# Patient Record
Sex: MALE | Race: WHITE | NOT HISPANIC OR LATINO | Employment: OTHER | URBAN - NONMETROPOLITAN AREA
[De-identification: names, ages, dates, MRNs, and addresses within clinical notes are randomized per-mention and may not be internally consistent; named-entity substitution may affect disease eponyms.]

---

## 2018-12-12 ENCOUNTER — OFFICE VISIT (OUTPATIENT)
Dept: RETAIL CLINIC | Facility: CLINIC | Age: 58
End: 2018-12-12

## 2018-12-12 VITALS
SYSTOLIC BLOOD PRESSURE: 120 MMHG | RESPIRATION RATE: 16 BRPM | WEIGHT: 166 LBS | DIASTOLIC BLOOD PRESSURE: 80 MMHG | TEMPERATURE: 98 F | OXYGEN SATURATION: 98 % | HEART RATE: 74 BPM

## 2018-12-12 DIAGNOSIS — R05.9 COUGH: ICD-10-CM

## 2018-12-12 DIAGNOSIS — R50.9 FEVER, UNSPECIFIED FEVER CAUSE: ICD-10-CM

## 2018-12-12 DIAGNOSIS — H66.90 EAR INFECTION: Primary | ICD-10-CM

## 2018-12-12 LAB
EXPIRATION DATE: NORMAL
FLUAV AG NPH QL: NEGATIVE
FLUBV AG NPH QL: NEGATIVE
INTERNAL CONTROL: NORMAL
Lab: NORMAL

## 2018-12-12 PROCEDURE — 87804 INFLUENZA ASSAY W/OPTIC: CPT | Performed by: NURSE PRACTITIONER

## 2018-12-12 PROCEDURE — 99203 OFFICE O/P NEW LOW 30 MIN: CPT | Performed by: NURSE PRACTITIONER

## 2018-12-12 RX ORDER — DEXTROMETHORPHAN HYDROBROMIDE AND PROMETHAZINE HYDROCHLORIDE 15; 6.25 MG/5ML; MG/5ML
5 SYRUP ORAL NIGHTLY PRN
Qty: 120 ML | Refills: 0 | Status: SHIPPED | OUTPATIENT
Start: 2018-12-12 | End: 2021-01-29

## 2018-12-12 RX ORDER — GABAPENTIN 800 MG/1
800 TABLET ORAL 3 TIMES DAILY
COMMUNITY
End: 2021-01-29

## 2018-12-12 RX ORDER — BUDESONIDE AND FORMOTEROL FUMARATE DIHYDRATE 160; 4.5 UG/1; UG/1
2 AEROSOL RESPIRATORY (INHALATION)
COMMUNITY
End: 2021-01-29 | Stop reason: SDUPTHER

## 2018-12-12 RX ORDER — ALBUTEROL SULFATE 90 UG/1
2 AEROSOL, METERED RESPIRATORY (INHALATION) EVERY 4 HOURS PRN
COMMUNITY
End: 2021-01-29

## 2018-12-12 RX ORDER — AMOXICILLIN AND CLAVULANATE POTASSIUM 875; 125 MG/1; MG/1
1 TABLET, FILM COATED ORAL 2 TIMES DAILY
Qty: 20 TABLET | Refills: 0 | Status: SHIPPED | OUTPATIENT
Start: 2018-12-12 | End: 2018-12-22

## 2018-12-12 RX ORDER — LEVOTHYROXINE SODIUM 0.1 MG/1
100 TABLET ORAL DAILY
COMMUNITY
End: 2021-01-29

## 2018-12-12 NOTE — PATIENT INSTRUCTIONS
Cough, Adult  A cough helps to clear your throat and lungs. A cough may last only 2-3 weeks (acute), or it may last longer than 8 weeks (chronic). Many different things can cause a cough. A cough may be a sign of an illness or another medical condition.  Follow these instructions at home:  · Pay attention to any changes in your cough.  · Take medicines only as told by your doctor.  ? If you were prescribed an antibiotic medicine, take it as told by your doctor. Do not stop taking it even if you start to feel better.  ? Talk with your doctor before you try using a cough medicine.  · Drink enough fluid to keep your pee (urine) clear or pale yellow.  · If the air is dry, use a cold steam vaporizer or humidifier in your home.  · Stay away from things that make you cough at work or at home.  · If your cough is worse at night, try using extra pillows to raise your head up higher while you sleep.  · Do not smoke, and try not to be around smoke. If you need help quitting, ask your doctor.  · Do not have caffeine.  · Do not drink alcohol.  · Rest as needed.  Contact a doctor if:  · You have new problems (symptoms).  · You cough up yellow fluid (pus).  · Your cough does not get better after 2-3 weeks, or your cough gets worse.  · Medicine does not help your cough and you are not sleeping well.  · You have pain that gets worse or pain that is not helped with medicine.  · You have a fever.  · You are losing weight and you do not know why.  · You have night sweats.  Get help right away if:  · You cough up blood.  · You have trouble breathing.  · Your heartbeat is very fast.  This information is not intended to replace advice given to you by your health care provider. Make sure you discuss any questions you have with your health care provider.  Document Released: 08/30/2012 Document Revised: 05/25/2017 Document Reviewed: 02/24/2016  [employer] Interactive Patient Education © 2018 [employer]

## 2018-12-12 NOTE — PROGRESS NOTES
Hussein presents to the clinic today c/o upper respiratory symptoms which started over a week ago and worsening.  Associated symptoms include congestion, cough, ear pain/pressure, fever and chills. He has tried Tylenol  without adequate relief. He does report he has had his Seasonal Influenza vaccination which was given appx four weeks ago.   URI    This is a new problem. The current episode started in the past 7 days. The problem has been gradually worsening. Maximum temperature: Tactile. The fever has been present for 1 to 2 days. Associated symptoms include congestion, coughing, ear pain, headaches and rhinorrhea. Pertinent negatives include no chest pain, nausea, rash, sinus pain, sore throat, swollen glands, vomiting or wheezing. He has tried acetaminophen for the symptoms.   Refer to ROS for additional information.  Vitals:    12/12/18 1128   BP: 120/80   Pulse: 74   Resp: 16   Temp: 98 °F (36.7 °C)   SpO2: 98%     The following portions of the patient's history were reviewed and updated as appropriate: allergies, current medications, past family history, past medical history, past social history, past surgical history and problem list.    Review of Systems   Constitutional: Positive for activity change, chills, fatigue and fever. Negative for appetite change.   HENT: Positive for congestion, ear pain, postnasal drip and rhinorrhea. Negative for sinus pressure, sinus pain and sore throat.    Eyes: Negative for discharge and redness.   Respiratory: Positive for cough. Negative for shortness of breath and wheezing.    Cardiovascular: Negative for chest pain.   Gastrointestinal: Negative for nausea and vomiting.   Musculoskeletal: Positive for myalgias.   Skin: Negative for color change and rash.   Allergic/Immunologic: Positive for immunocompromised state.   Neurological: Positive for headaches.   Hematological: Negative for adenopathy.   Psychiatric/Behavioral: Positive for sleep disturbance (Due to cough).    All other systems reviewed and are negative.    Physical Exam   Constitutional: He is oriented to person, place, and time. He appears well-developed and well-nourished. No distress.   HENT:   Head: Normocephalic.   Right Ear: Ear canal normal. No mastoid tenderness. Tympanic membrane is erythematous and bulging.   Left Ear: Ear canal normal. No mastoid tenderness. Tympanic membrane is erythematous and bulging.   Nose: Mucosal edema and rhinorrhea present. Right sinus exhibits frontal sinus tenderness. Right sinus exhibits no maxillary sinus tenderness. Left sinus exhibits frontal sinus tenderness. Left sinus exhibits no maxillary sinus tenderness.   Mouth/Throat: Oropharynx is clear and moist. No oropharyngeal exudate.   Eyes: Conjunctivae are normal. Pupils are equal, round, and reactive to light. Right eye exhibits no discharge. Left eye exhibits no discharge. No scleral icterus.   Neck: Neck supple. No tracheal tenderness present.   Cardiovascular: Normal rate, regular rhythm and normal heart sounds. Exam reveals no friction rub.   No murmur heard.  Pulmonary/Chest: Effort normal. No respiratory distress. He has decreased breath sounds. He has no wheezes. He has no rhonchi. He has no rales.   Lymphadenopathy:     He has no cervical adenopathy.   Neurological: He is alert and oriented to person, place, and time.   Skin: Skin is warm and dry. Capillary refill takes less than 2 seconds. No rash noted. No erythema.   Psychiatric: He has a normal mood and affect. His speech is normal and behavior is normal. Judgment and thought content normal.   Vitals reviewed.    Assessment/Plan   Problems Addressed this Visit     None      Visit Diagnoses     Ear infection    -  Primary    Relevant Medications    amoxicillin-clavulanate (AUGMENTIN) 875-125 MG per tablet    Cough        Relevant Medications    promethazine-dextromethorphan (PROMETHAZINE-DM) 6.25-15 MG/5ML syrup    Fever, unspecified fever cause        Relevant  Orders    POC Influenza A / B (Completed)      Findings and recommendations discussed with Hussein. Reviewed results of his Influenza A&B which were both negative. Treatment options reviewed. Counseled regarding supportive care measures. Encouraged him to seek further medical evaluation if symptoms worsen or do not improve within 48-72 hours.  Lab Results   Component Value Date    RAPFLUA Negative 12/12/2018    RAPFLUB Negative 12/12/2018

## 2021-01-29 ENCOUNTER — OFFICE VISIT (OUTPATIENT)
Dept: FAMILY MEDICINE CLINIC | Facility: CLINIC | Age: 61
End: 2021-01-29

## 2021-01-29 VITALS
HEIGHT: 69 IN | DIASTOLIC BLOOD PRESSURE: 78 MMHG | OXYGEN SATURATION: 99 % | TEMPERATURE: 97.1 F | SYSTOLIC BLOOD PRESSURE: 130 MMHG | BODY MASS INDEX: 27.19 KG/M2 | HEART RATE: 78 BPM | WEIGHT: 183.6 LBS

## 2021-01-29 DIAGNOSIS — Z76.89 ENCOUNTER TO ESTABLISH CARE: Primary | ICD-10-CM

## 2021-01-29 DIAGNOSIS — Z87.891 FORMER CIGARETTE SMOKER: ICD-10-CM

## 2021-01-29 DIAGNOSIS — J41.0 SIMPLE CHRONIC BRONCHITIS (HCC): ICD-10-CM

## 2021-01-29 DIAGNOSIS — E03.9 ACQUIRED HYPOTHYROIDISM: ICD-10-CM

## 2021-01-29 PROCEDURE — 84443 ASSAY THYROID STIM HORMONE: CPT | Performed by: FAMILY MEDICINE

## 2021-01-29 PROCEDURE — 80053 COMPREHEN METABOLIC PANEL: CPT | Performed by: FAMILY MEDICINE

## 2021-01-29 PROCEDURE — 85025 COMPLETE CBC W/AUTO DIFF WBC: CPT | Performed by: FAMILY MEDICINE

## 2021-01-29 PROCEDURE — 99203 OFFICE O/P NEW LOW 30 MIN: CPT | Performed by: FAMILY MEDICINE

## 2021-01-29 PROCEDURE — 83735 ASSAY OF MAGNESIUM: CPT | Performed by: FAMILY MEDICINE

## 2021-01-29 PROCEDURE — 36415 COLL VENOUS BLD VENIPUNCTURE: CPT | Performed by: FAMILY MEDICINE

## 2021-01-29 RX ORDER — LEVOTHYROXINE SODIUM 0.12 MG/1
125 TABLET ORAL DAILY
COMMUNITY
End: 2021-01-29 | Stop reason: SDUPTHER

## 2021-01-29 RX ORDER — GABAPENTIN 300 MG/1
300 CAPSULE ORAL 3 TIMES DAILY
COMMUNITY
Start: 2021-01-03 | End: 2021-04-28

## 2021-01-29 RX ORDER — BUDESONIDE AND FORMOTEROL FUMARATE DIHYDRATE 160; 4.5 UG/1; UG/1
2 AEROSOL RESPIRATORY (INHALATION)
Qty: 10.2 G | Refills: 1 | Status: SHIPPED | OUTPATIENT
Start: 2021-01-29 | End: 2021-04-28 | Stop reason: SDUPTHER

## 2021-01-29 RX ORDER — LEVOTHYROXINE SODIUM 0.12 MG/1
125 TABLET ORAL DAILY
Qty: 90 TABLET | Refills: 0 | Status: SHIPPED | OUTPATIENT
Start: 2021-01-29 | End: 2021-04-28 | Stop reason: SDUPTHER

## 2021-01-29 NOTE — PROGRESS NOTES
"Subjective   Hussein Roland is a 60 y.o. male.   Pt presents today with CC of COPD      History of Present Illness   Patient is a 60-year-old male here to establish care.  He recent moved up from [place].  #1 he reports a history of \"herniated disc\" in his back, though does not offer any other details.  He walks with a cane for reported chronic back and right knee pain.  He states that he had a meniscus repair by Dr. Dubin years ago that he never did fully recover from.  He still follows with Dr. Dubin.  #2 he has hypothyroidism.  He last had his thyroid checked and medication adjusted late in the fall last year.  He is due for labs.  He is agreeable to labs today.  #3 his previous PCP was [facility] Schroeder.  He is agreeable to sign for records release.  #4 he is a former heavy smoker.  He quit about 1 year ago.  He takes Symbicort 160-4.5.  He is doing well on his current regimen.         The following portions of the patient's history were reviewed and updated as appropriate: allergies, current medications, past family history, past medical history, past social history, past surgical history and problem list.    Review of Systems   Constitutional: Negative for chills, fever and unexpected weight loss.   HENT: Negative for congestion and sore throat.    Eyes: Negative for blurred vision and visual disturbance.   Respiratory: Negative for cough and wheezing.    Cardiovascular: Negative for chest pain and palpitations.   Gastrointestinal: Negative for abdominal pain and diarrhea.   Endocrine: Negative for cold intolerance and heat intolerance.   Genitourinary: Negative for dysuria.   Musculoskeletal: Negative for arthralgias and neck stiffness.   Neurological: Negative for dizziness, seizures and syncope.   Psychiatric/Behavioral: Negative for self-injury, suicidal ideas and depressed mood.       Objective   Physical Exam  Vitals signs and nursing note reviewed.   Constitutional:       Appearance: He is " well-developed.   HENT:      Head: Normocephalic and atraumatic.      Right Ear: External ear normal.      Left Ear: External ear normal.      Nose: Nose normal.   Eyes:      Conjunctiva/sclera: Conjunctivae normal.      Pupils: Pupils are equal, round, and reactive to light.   Neck:      Musculoskeletal: Normal range of motion and neck supple.   Cardiovascular:      Rate and Rhythm: Normal rate and regular rhythm.      Heart sounds: Normal heart sounds.   Pulmonary:      Effort: Pulmonary effort is normal.      Breath sounds: Normal breath sounds.   Abdominal:      General: Bowel sounds are normal.      Palpations: Abdomen is soft.   Musculoskeletal:      Comments: Walks with a cane,  Hips, knees, and ankles with full range of motion and 5/5 strength bilaterally. DTRs symmetrical. Straight leg raise test negative bilaterally.     Skin:     General: Skin is warm and dry.   Neurological:      Mental Status: He is alert and oriented to person, place, and time.           Assessment/Plan   Diagnoses and all orders for this visit:    1. Encounter to establish care (Primary)     2. Acquired hypothyroidism  -     CBC Auto Differential; Future  -     Comprehensive Metabolic Panel; Future  -     TSH; Future  -     Magnesium; Future  -     levothyroxine (SYNTHROID, LEVOTHROID) 125 MCG tablet; Take 1 tablet by mouth Daily.  Dispense: 90 tablet; Refill: 0  -     CBC Auto Differential  -     Comprehensive Metabolic Panel  -     TSH  -     Magnesium  He is going to sign for records release from his prior PCP.  We will continue levothyroxine his current dose.  We will recheck today and will make changes as needed.  Denies symptoms of hypothyroidism.  3. Former cigarette smoker    4. Simple chronic bronchitis (CMS/HCC)  -     budesonide-formoterol (SYMBICORT) 160-4.5 MCG/ACT inhaler; Inhale 2 puffs 2 (Two) Times a Day.  Dispense: 10.2 g; Refill: 1    We will continue his current treatment.  He states that he does not need  albuterol.  He states that it does not typically help, Symbicort has been helpful, however.  #5 chronic pain  Mark was reviewed and was empty, I have no records to go on.  Regretfully, we will not continue gabapentin at this time as there is no record of it.  We will get records, if he is on it for confirmed reason based on data, we will continue this medication in the future.       Patient's Body mass index is 27.11 kg/m². BMI is above normal parameters. Recommendations include: exercise counseling and nutrition counseling.

## 2021-01-30 LAB
ALBUMIN SERPL-MCNC: 4.3 G/DL (ref 3.5–5.2)
ALBUMIN/GLOB SERPL: 1.4 G/DL
ALP SERPL-CCNC: 80 U/L (ref 39–117)
ALT SERPL W P-5'-P-CCNC: 22 U/L (ref 1–41)
ANION GAP SERPL CALCULATED.3IONS-SCNC: 9.8 MMOL/L (ref 5–15)
AST SERPL-CCNC: 25 U/L (ref 1–40)
BASOPHILS # BLD AUTO: 0.05 10*3/MM3 (ref 0–0.2)
BASOPHILS NFR BLD AUTO: 0.4 % (ref 0–1.5)
BILIRUB SERPL-MCNC: 0.2 MG/DL (ref 0–1.2)
BUN SERPL-MCNC: 8 MG/DL (ref 6–20)
BUN/CREAT SERPL: 9.2 (ref 7–25)
CALCIUM SPEC-SCNC: 9.1 MG/DL (ref 8.6–10.5)
CHLORIDE SERPL-SCNC: 103 MMOL/L (ref 98–107)
CO2 SERPL-SCNC: 26.2 MMOL/L (ref 22–29)
CREAT SERPL-MCNC: 0.87 MG/DL (ref 0.76–1.27)
DEPRECATED RDW RBC AUTO: 49.1 FL (ref 37–54)
EOSINOPHIL # BLD AUTO: 0.18 10*3/MM3 (ref 0–0.4)
EOSINOPHIL NFR BLD AUTO: 1.5 % (ref 0.3–6.2)
ERYTHROCYTE [DISTWIDTH] IN BLOOD BY AUTOMATED COUNT: 14.5 % (ref 12.3–15.4)
GFR SERPL CREATININE-BSD FRML MDRD: 90 ML/MIN/1.73
GLOBULIN UR ELPH-MCNC: 3.1 GM/DL
GLUCOSE SERPL-MCNC: 111 MG/DL (ref 65–99)
HCT VFR BLD AUTO: 42.1 % (ref 37.5–51)
HGB BLD-MCNC: 14.5 G/DL (ref 13–17.7)
IMM GRANULOCYTES # BLD AUTO: 0.08 10*3/MM3 (ref 0–0.05)
IMM GRANULOCYTES NFR BLD AUTO: 0.7 % (ref 0–0.5)
LYMPHOCYTES # BLD AUTO: 4.17 10*3/MM3 (ref 0.7–3.1)
LYMPHOCYTES NFR BLD AUTO: 34.5 % (ref 19.6–45.3)
MAGNESIUM SERPL-MCNC: 2.3 MG/DL (ref 1.6–2.6)
MCH RBC QN AUTO: 31.9 PG (ref 26.6–33)
MCHC RBC AUTO-ENTMCNC: 34.4 G/DL (ref 31.5–35.7)
MCV RBC AUTO: 92.5 FL (ref 79–97)
MONOCYTES # BLD AUTO: 1.25 10*3/MM3 (ref 0.1–0.9)
MONOCYTES NFR BLD AUTO: 10.3 % (ref 5–12)
NEUTROPHILS NFR BLD AUTO: 52.6 % (ref 42.7–76)
NEUTROPHILS NFR BLD AUTO: 6.37 10*3/MM3 (ref 1.7–7)
NRBC BLD AUTO-RTO: 0 /100 WBC (ref 0–0.2)
PLATELET # BLD AUTO: 274 10*3/MM3 (ref 140–450)
PMV BLD AUTO: 12.7 FL (ref 6–12)
POTASSIUM SERPL-SCNC: 4.8 MMOL/L (ref 3.5–5.2)
PROT SERPL-MCNC: 7.4 G/DL (ref 6–8.5)
RBC # BLD AUTO: 4.55 10*6/MM3 (ref 4.14–5.8)
SODIUM SERPL-SCNC: 139 MMOL/L (ref 136–145)
TSH SERPL DL<=0.05 MIU/L-ACNC: 0.37 UIU/ML (ref 0.27–4.2)
WBC # BLD AUTO: 12.1 10*3/MM3 (ref 3.4–10.8)

## 2021-02-01 ENCOUNTER — TELEPHONE (OUTPATIENT)
Dept: FAMILY MEDICINE CLINIC | Facility: CLINIC | Age: 61
End: 2021-02-01

## 2021-02-01 NOTE — TELEPHONE ENCOUNTER
----- Message from Jesus Webster DO sent at 2/1/2021 10:11 AM EST -----  I reviewed your labs.  Your thyroid is on the upper end of normal functioning.  But still normal.  Recommend continuing current dose.  We will recheck in a few months.  Your white blood cell count was a little elevated, not a major concern.  We will recheck at follow-up.    No answer no voicemail.    No answer no voicemail.    Patient notified.

## 2021-02-11 DIAGNOSIS — J41.0 SIMPLE CHRONIC BRONCHITIS (HCC): Primary | ICD-10-CM

## 2021-02-11 RX ORDER — ALBUTEROL SULFATE 90 UG/1
2 AEROSOL, METERED RESPIRATORY (INHALATION) EVERY 4 HOURS PRN
Qty: 18 G | Refills: 3 | Status: SHIPPED | OUTPATIENT
Start: 2021-02-11 | End: 2021-04-28 | Stop reason: SDUPTHER

## 2021-04-28 ENCOUNTER — OFFICE VISIT (OUTPATIENT)
Dept: FAMILY MEDICINE CLINIC | Facility: CLINIC | Age: 61
End: 2021-04-28

## 2021-04-28 VITALS
HEIGHT: 69 IN | DIASTOLIC BLOOD PRESSURE: 76 MMHG | SYSTOLIC BLOOD PRESSURE: 128 MMHG | TEMPERATURE: 98.1 F | BODY MASS INDEX: 25.8 KG/M2 | WEIGHT: 174.2 LBS | HEART RATE: 90 BPM | OXYGEN SATURATION: 98 %

## 2021-04-28 DIAGNOSIS — J41.0 SIMPLE CHRONIC BRONCHITIS (HCC): ICD-10-CM

## 2021-04-28 DIAGNOSIS — D72.829 LEUKOCYTOSIS, UNSPECIFIED TYPE: ICD-10-CM

## 2021-04-28 DIAGNOSIS — G89.29 CHRONIC BILATERAL LOW BACK PAIN WITHOUT SCIATICA: ICD-10-CM

## 2021-04-28 DIAGNOSIS — E03.9 ACQUIRED HYPOTHYROIDISM: Primary | ICD-10-CM

## 2021-04-28 DIAGNOSIS — Q89.01 ASPLENIA: ICD-10-CM

## 2021-04-28 DIAGNOSIS — M54.50 CHRONIC BILATERAL LOW BACK PAIN WITHOUT SCIATICA: ICD-10-CM

## 2021-04-28 PROCEDURE — 85025 COMPLETE CBC W/AUTO DIFF WBC: CPT | Performed by: FAMILY MEDICINE

## 2021-04-28 PROCEDURE — 36415 COLL VENOUS BLD VENIPUNCTURE: CPT | Performed by: FAMILY MEDICINE

## 2021-04-28 PROCEDURE — 84443 ASSAY THYROID STIM HORMONE: CPT | Performed by: FAMILY MEDICINE

## 2021-04-28 PROCEDURE — 99214 OFFICE O/P EST MOD 30 MIN: CPT | Performed by: FAMILY MEDICINE

## 2021-04-28 RX ORDER — ALBUTEROL SULFATE 90 UG/1
2 AEROSOL, METERED RESPIRATORY (INHALATION) EVERY 4 HOURS PRN
Qty: 18 G | Refills: 3 | Status: SHIPPED | OUTPATIENT
Start: 2021-04-28 | End: 2021-08-04 | Stop reason: SDUPTHER

## 2021-04-28 RX ORDER — BUDESONIDE AND FORMOTEROL FUMARATE DIHYDRATE 160; 4.5 UG/1; UG/1
2 AEROSOL RESPIRATORY (INHALATION)
Qty: 10.2 G | Refills: 4 | Status: SHIPPED | OUTPATIENT
Start: 2021-04-28 | End: 2021-08-04 | Stop reason: SDUPTHER

## 2021-04-28 RX ORDER — LEVOTHYROXINE SODIUM 0.12 MG/1
125 TABLET ORAL DAILY
Qty: 90 TABLET | Refills: 2 | Status: SHIPPED | OUTPATIENT
Start: 2021-04-28 | End: 2021-08-04 | Stop reason: SDUPTHER

## 2021-04-29 LAB
BASOPHILS # BLD AUTO: 0.03 10*3/MM3 (ref 0–0.2)
BASOPHILS NFR BLD AUTO: 0.2 % (ref 0–1.5)
DEPRECATED RDW RBC AUTO: 47.7 FL (ref 37–54)
EOSINOPHIL # BLD AUTO: 0.1 10*3/MM3 (ref 0–0.4)
EOSINOPHIL NFR BLD AUTO: 0.7 % (ref 0.3–6.2)
ERYTHROCYTE [DISTWIDTH] IN BLOOD BY AUTOMATED COUNT: 14.1 % (ref 12.3–15.4)
HCT VFR BLD AUTO: 39.9 % (ref 37.5–51)
HGB BLD-MCNC: 13.6 G/DL (ref 13–17.7)
IMM GRANULOCYTES # BLD AUTO: 0.1 10*3/MM3 (ref 0–0.05)
IMM GRANULOCYTES NFR BLD AUTO: 0.7 % (ref 0–0.5)
LYMPHOCYTES # BLD AUTO: 2.74 10*3/MM3 (ref 0.7–3.1)
LYMPHOCYTES NFR BLD AUTO: 19 % (ref 19.6–45.3)
MCH RBC QN AUTO: 31.3 PG (ref 26.6–33)
MCHC RBC AUTO-ENTMCNC: 34.1 G/DL (ref 31.5–35.7)
MCV RBC AUTO: 91.7 FL (ref 79–97)
MONOCYTES # BLD AUTO: 1.57 10*3/MM3 (ref 0.1–0.9)
MONOCYTES NFR BLD AUTO: 10.9 % (ref 5–12)
NEUTROPHILS NFR BLD AUTO: 68.5 % (ref 42.7–76)
NEUTROPHILS NFR BLD AUTO: 9.86 10*3/MM3 (ref 1.7–7)
NRBC BLD AUTO-RTO: 0 /100 WBC (ref 0–0.2)
PLATELET # BLD AUTO: 310 10*3/MM3 (ref 140–450)
PMV BLD AUTO: 12.3 FL (ref 6–12)
RBC # BLD AUTO: 4.35 10*6/MM3 (ref 4.14–5.8)
TSH SERPL DL<=0.05 MIU/L-ACNC: 0.68 UIU/ML (ref 0.27–4.2)
WBC # BLD AUTO: 14.4 10*3/MM3 (ref 3.4–10.8)

## 2021-05-02 NOTE — PROGRESS NOTES
Subjective   Hussein Roland is a 60 y.o. male.   Pt presents today with CC of Annual Exam      History of Present Illness   1.  Patient is a 60-year-old male here to follow-up on hypothyroidism.  He takes levothyroxine 125 mcg daily.  He is agreeable to having his levels rechecked today.  #2 he has chronic leukocytosis, unspecified type.  He is agreeable to have repeat blood work today.  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  This presumably is why he has leukocytosis.  #3 he has chronic bronchitis associated with smoking.  He takes Symbicort and albuterol.  He does well on this.  4 he has chronic bilateral low back pain.  He was taking gabapentin in the past.  He has not yet signed for records release.  His urine drug screen was appropriate for having gabapentin only.         The following portions of the patient's history were reviewed and updated as appropriate: allergies, current medications, past family history, past medical history, past social history, past surgical history and problem list.    Review of Systems   Constitutional: Negative for chills, fever and unexpected weight loss.   HENT: Negative for congestion and sore throat.    Eyes: Negative for blurred vision and visual disturbance.   Respiratory: Negative for cough and wheezing.    Cardiovascular: Negative for chest pain and palpitations.   Gastrointestinal: Negative for abdominal pain and diarrhea.   Endocrine: Negative for cold intolerance and heat intolerance.   Genitourinary: Negative for dysuria.   Musculoskeletal: Negative for arthralgias and neck stiffness.   Neurological: Negative for dizziness, seizures and syncope.   Psychiatric/Behavioral: Negative for self-injury, suicidal ideas and depressed mood.       Objective   Physical Exam  Vitals and nursing note reviewed.   Constitutional:       Appearance: He is well-developed.   HENT:      Head: Normocephalic and atraumatic.      Right Ear: External ear  normal.      Left Ear: External ear normal.      Nose: Nose normal.   Eyes:      Conjunctiva/sclera: Conjunctivae normal.      Pupils: Pupils are equal, round, and reactive to light.   Cardiovascular:      Rate and Rhythm: Normal rate and regular rhythm.      Heart sounds: Normal heart sounds.   Pulmonary:      Effort: Pulmonary effort is normal.      Breath sounds: Normal breath sounds.   Abdominal:      General: Bowel sounds are normal.      Palpations: Abdomen is soft.   Musculoskeletal:      Cervical back: Normal range of motion and neck supple.   Skin:     General: Skin is warm and dry.   Neurological:      Mental Status: He is alert and oriented to person, place, and time.   Psychiatric:         Behavior: Behavior normal.           Assessment/Plan   Diagnoses and all orders for this visit:    1. Acquired hypothyroidism (Primary)  -     TSH; Future  -     levothyroxine (SYNTHROID, LEVOTHROID) 125 MCG tablet; Take 1 tablet by mouth Daily.  Dispense: 90 tablet; Refill: 2  -     TSH    2. Leukocytosis, unspecified type  -     CBC Auto Differential; Future  -     CBC Auto Differential    3. Simple chronic bronchitis (CMS/HCC)  -     budesonide-formoterol (SYMBICORT) 160-4.5 MCG/ACT inhaler; Inhale 2 puffs 2 (Two) Times a Day.  Dispense: 10.2 g; Refill: 4  -     albuterol sulfate  (90 Base) MCG/ACT inhaler; Inhale 2 puffs Every 4 (Four) Hours As Needed for Wheezing.  Dispense: 18 g; Refill: 3    4. Chronic bilateral low back pain without sciatica  Recommend NSAIDs as needed.  It is unclear why his prior provider took him off of gabapentin.  He would like to get started back on it today.  Regretfully, I am not willing to do this until I find out why he was taken off.  He reports increased pain since being off of this medication.  I have let him know that if there was a reason why he was taken off the medication, I would not restart it.  5. Asplenia  Likely associated with his leukocytosis.                Patient's Body mass index is 25.72 kg/m². BMI is above normal parameters. Recommendations include: exercise counseling and nutrition counseling.

## 2021-05-04 ENCOUNTER — TELEPHONE (OUTPATIENT)
Dept: FAMILY MEDICINE CLINIC | Facility: CLINIC | Age: 61
End: 2021-05-04

## 2021-05-04 NOTE — TELEPHONE ENCOUNTER
----- Message from Jesus Webster DO sent at 5/4/2021  8:15 AM EDT -----  I reviewed your blood work.  Your white blood cell count is still elevated.  I assume this is due to not having a spleen.  However, not having a spleen also puts you at higher risk of infection from encapsulated organisms.  Please follow-up promptly if you develop signs of infection.  Your thyroid function was normal.

## 2021-05-06 NOTE — TELEPHONE ENCOUNTER
Called pt and advised. Pt verbalized understanding. Pt denies any symptoms or signs of illness, pt was advised to seek medical attention if he has any issues. Pt verbalized understanding.

## 2021-05-18 ENCOUNTER — TELEPHONE (OUTPATIENT)
Dept: FAMILY MEDICINE CLINIC | Facility: CLINIC | Age: 61
End: 2021-05-18

## 2021-05-18 DIAGNOSIS — M79.2 NEUROPATHIC PAIN: Primary | ICD-10-CM

## 2021-05-18 RX ORDER — GABAPENTIN 300 MG/1
300 CAPSULE ORAL 3 TIMES DAILY
Qty: 90 CAPSULE | Refills: 2 | Status: SHIPPED | OUTPATIENT
Start: 2021-05-18 | End: 2021-08-04 | Stop reason: SDUPTHER

## 2021-05-18 NOTE — TELEPHONE ENCOUNTER
I reviewed your records from Dr. Garduno.  Everything was in order.  I sent prescription of gabapentin 300 mg 3 times a day to your [facility] pharmacy.  Please set him up for follow-up in 3 months.       Spoke with patient & he verbalized understanding,F/U scheduled.

## 2021-08-04 ENCOUNTER — OFFICE VISIT (OUTPATIENT)
Dept: FAMILY MEDICINE CLINIC | Facility: CLINIC | Age: 61
End: 2021-08-04

## 2021-08-04 VITALS
OXYGEN SATURATION: 99 % | TEMPERATURE: 97.9 F | BODY MASS INDEX: 25.95 KG/M2 | SYSTOLIC BLOOD PRESSURE: 134 MMHG | HEART RATE: 73 BPM | WEIGHT: 175.2 LBS | DIASTOLIC BLOOD PRESSURE: 80 MMHG | HEIGHT: 69 IN

## 2021-08-04 DIAGNOSIS — M79.2 NEUROPATHIC PAIN: ICD-10-CM

## 2021-08-04 DIAGNOSIS — E03.9 ACQUIRED HYPOTHYROIDISM: ICD-10-CM

## 2021-08-04 DIAGNOSIS — M25.511 RIGHT ANTERIOR SHOULDER PAIN: ICD-10-CM

## 2021-08-04 DIAGNOSIS — J41.0 SIMPLE CHRONIC BRONCHITIS (HCC): ICD-10-CM

## 2021-08-04 DIAGNOSIS — M99.07 SEGMENTAL AND SOMATIC DYSFUNCTION OF UPPER EXTREMITY: ICD-10-CM

## 2021-08-04 DIAGNOSIS — M75.41 SHOULDER IMPINGEMENT, RIGHT: Primary | ICD-10-CM

## 2021-08-04 PROCEDURE — 98925 OSTEOPATH MANJ 1-2 REGIONS: CPT | Performed by: FAMILY MEDICINE

## 2021-08-04 PROCEDURE — 99214 OFFICE O/P EST MOD 30 MIN: CPT | Performed by: FAMILY MEDICINE

## 2021-08-04 RX ORDER — GABAPENTIN 300 MG/1
300 CAPSULE ORAL 3 TIMES DAILY
Qty: 90 CAPSULE | Refills: 2 | Status: SHIPPED | OUTPATIENT
Start: 2021-08-04 | End: 2021-10-28 | Stop reason: SDUPTHER

## 2021-08-04 RX ORDER — BUDESONIDE AND FORMOTEROL FUMARATE DIHYDRATE 160; 4.5 UG/1; UG/1
2 AEROSOL RESPIRATORY (INHALATION)
Qty: 10.2 G | Refills: 4 | Status: SHIPPED | OUTPATIENT
Start: 2021-08-04 | End: 2021-10-28 | Stop reason: SDUPTHER

## 2021-08-04 RX ORDER — LEVOTHYROXINE SODIUM 0.12 MG/1
125 TABLET ORAL DAILY
Qty: 90 TABLET | Refills: 2 | Status: SHIPPED | OUTPATIENT
Start: 2021-08-04 | End: 2021-10-28 | Stop reason: SDUPTHER

## 2021-08-04 RX ORDER — ALBUTEROL SULFATE 90 UG/1
2 AEROSOL, METERED RESPIRATORY (INHALATION) EVERY 4 HOURS PRN
Qty: 18 G | Refills: 3 | Status: SHIPPED | OUTPATIENT
Start: 2021-08-04 | End: 2021-10-28 | Stop reason: SDUPTHER

## 2021-08-04 NOTE — PROGRESS NOTES
Subjective   Hussein Roland is a 60 y.o. male.   Pt presents today with CC of Back Pain      History of Present Illness   1.  Patient is a 60-year-old male here to follow-up on neuropathic pain.  He takes Neurontin 300 mg 3 times a day.  He has been on this regimen for years.  He does well.  UDS and controlled substance agreement are in the chart.  He needs refill.  #2 he has hypothyroidism for which he takes levothyroxine 125 daily.  He does well.  #3 he has chronic bronchitis for which he takes Symbicort and albuterol.  He is doing well on his current regimen.  He would like refill.  #4 he complains of right shoulder pain over the past couple of months.  He also has pain on his lateral shoulder with abduction greater than about 80 degrees.  He would like evaluation of this.  He denies recent injury.           The following portions of the patient's history were reviewed and updated as appropriate: allergies, current medications, past family history, past medical history, past social history, past surgical history and problem list.    Review of Systems   Constitutional: Negative for chills, fever and unexpected weight loss.   HENT: Negative for congestion and sore throat.    Eyes: Negative for blurred vision and visual disturbance.   Respiratory: Negative for cough and wheezing.    Cardiovascular: Negative for chest pain and palpitations.   Gastrointestinal: Negative for abdominal pain and diarrhea.   Endocrine: Negative for cold intolerance and heat intolerance.   Genitourinary: Negative for dysuria.   Musculoskeletal: Positive for arthralgias. Negative for neck stiffness.   Neurological: Negative for dizziness, seizures and syncope.   Psychiatric/Behavioral: Negative for self-injury, suicidal ideas and depressed mood.       Objective   Physical Exam  Vitals and nursing note reviewed.   Constitutional:       Appearance: He is well-developed.   HENT:      Head: Normocephalic and atraumatic.      Right Ear: External  ear normal.      Left Ear: External ear normal.      Nose: Nose normal.   Eyes:      Conjunctiva/sclera: Conjunctivae normal.      Pupils: Pupils are equal, round, and reactive to light.   Cardiovascular:      Rate and Rhythm: Normal rate and regular rhythm.      Heart sounds: Normal heart sounds.   Pulmonary:      Effort: Pulmonary effort is normal.      Breath sounds: Normal breath sounds.   Abdominal:      General: Bowel sounds are normal.      Palpations: Abdomen is soft.   Musculoskeletal:      Cervical back: Normal range of motion and neck supple.      Comments: Poor protraction of the right scapula, both scapulae are somewhat elevated due to spasm in his rhomboids and neck muscles including levator scapula bilaterally.  This is led to improper mechanics of the shoulder.  He has pain at the tip of the acromion and slightly anterior with abduction.  Impingement is evident.  He holds his shoulder girdle higher than he should, likely due to pain.  Negative special tests of the arm otherwise.  Osteopathic: Poor protraction of the scapula on the right side.  Spasm of the rhomboids.   Skin:     General: Skin is warm and dry.   Neurological:      Mental Status: He is alert and oriented to person, place, and time.   Psychiatric:         Behavior: Behavior normal.           Assessment/Plan   Diagnoses and all orders for this visit:    1. Shoulder impingement, right (Primary)  Treatment as below.  2. Neuropathic pain  -     gabapentin (NEURONTIN) 300 MG capsule; Take 1 capsule by mouth 3 (Three) Times a Day.  Dispense: 90 capsule; Refill: 2    3. Acquired hypothyroidism  -     levothyroxine (SYNTHROID, LEVOTHROID) 125 MCG tablet; Take 1 tablet by mouth Daily.  Dispense: 90 tablet; Refill: 2    4. Simple chronic bronchitis (CMS/HCC)  -     budesonide-formoterol (SYMBICORT) 160-4.5 MCG/ACT inhaler; Inhale 2 puffs 2 (Two) Times a Day.  Dispense: 10.2 g; Refill: 4  -     albuterol sulfate  (90 Base) MCG/ACT inhaler;  Inhale 2 puffs Every 4 (Four) Hours As Needed for Wheezing.  Dispense: 18 g; Refill: 3    5. Right anterior shoulder pain  Recommend stretching of the neck and shoulder.  We discussed treatment options and he wanted to move forward with osteopathic manipulation today.  Soft tissue stretching and treatment of the clavicle, scapula, upper Thoracics, and rhomboids resulted in better movement of the scapula and less pain.  6. Segmental and somatic dysfunction of upper extremity  OMT procedure, expected risks and benefits discussed with patient, who elects to proceed. Verbal consent obtained. 1 techniques were used. Pt tolerated OMT well. No complications noted. Patient to do home stretches as shown in clinic today or instructed in after visit summary.                    Patient's Body mass index is 25.87 kg/m². indicating that he is overweight (BMI 25-29.9). Obesity-related health conditions include the following: osteoarthritis. Obesity is unchanged. BMI is is above average; BMI management plan is completed. We discussed portion control and increasing exercise..

## 2021-09-16 ENCOUNTER — OFFICE VISIT (OUTPATIENT)
Dept: FAMILY MEDICINE CLINIC | Facility: CLINIC | Age: 61
End: 2021-09-16

## 2021-09-16 VITALS
HEART RATE: 81 BPM | TEMPERATURE: 97.1 F | HEIGHT: 69 IN | BODY MASS INDEX: 26.1 KG/M2 | DIASTOLIC BLOOD PRESSURE: 76 MMHG | WEIGHT: 176.2 LBS | SYSTOLIC BLOOD PRESSURE: 130 MMHG | OXYGEN SATURATION: 99 %

## 2021-09-16 DIAGNOSIS — M25.511 CHRONIC RIGHT SHOULDER PAIN: Primary | ICD-10-CM

## 2021-09-16 DIAGNOSIS — G89.29 CHRONIC RIGHT SHOULDER PAIN: Primary | ICD-10-CM

## 2021-09-16 PROCEDURE — 20610 DRAIN/INJ JOINT/BURSA W/O US: CPT | Performed by: FAMILY MEDICINE

## 2021-09-16 PROCEDURE — 99213 OFFICE O/P EST LOW 20 MIN: CPT | Performed by: FAMILY MEDICINE

## 2021-09-16 NOTE — PROGRESS NOTES
Subjective   Hussein Roland is a 60 y.o. male.   Pt presents today with CC of Shoulder Pain (right shoulder)      History of Present Illness   Patient is here to follow-up on chronic right shoulder pain.  He reports that he has done well with injections in the past and would like to move forward with this.  His shoulder has become stiff and it is painful to reach overhead.  If indicated he would like a steroid injection today.       The following portions of the patient's history were reviewed and updated as appropriate: allergies, current medications, past family history, past medical history, past social history, past surgical history and problem list.    Review of Systems   Constitutional: Negative for chills, fever and unexpected weight loss.   HENT: Negative for congestion and sore throat.    Eyes: Negative for blurred vision and visual disturbance.   Respiratory: Negative for cough and wheezing.    Cardiovascular: Negative for chest pain and palpitations.   Gastrointestinal: Negative for abdominal pain and diarrhea.   Endocrine: Negative for cold intolerance and heat intolerance.   Genitourinary: Negative for dysuria.   Musculoskeletal: Negative for arthralgias and neck stiffness.   Neurological: Negative for dizziness, seizures and syncope.   Psychiatric/Behavioral: Negative for self-injury, suicidal ideas and depressed mood.       Objective   Physical Exam  Vitals and nursing note reviewed.   Constitutional:       Appearance: Normal appearance.   Musculoskeletal:      Comments: Right shoulder appears normal.  No crepitus.  Has nearly full passive range of motion though active range of motion is limited in abduction and flexion to only about 90 degrees.  Extension is within normal limits.   Neurological:      Mental Status: He is alert.       Arthrocentesis    Date/Time: 9/19/2021 11:33 AM  Performed by: Jesus Webster DO  Authorized by: Jesus Webster DO   Consent: Written consent  obtained.  Risks and benefits: risks, benefits and alternatives were discussed  Consent given by: patient  Patient understanding: patient states understanding of the procedure being performed  Comments: Procedure, expected risks and benefits discussed with patient, who elects to proceed. Written consent signed and witnessed.  Patient tolerated procedure well. EBL minimal. No immediate complications noted. Patient advised to call for fever, swelling, or significantly increased pain at injection site.             Assessment/Plan   Diagnoses and all orders for this visit:    1. Chronic right shoulder pain (Primary)  -     Arthrocentesis    We discussed getting imaging ahead of time, he would like to move forward with injection today.  This is reasonable as his anatomy seems to be normal.  Injection went well.  We will consider physical therapy going forward.  If physical therapy indicated, will order an x-ray ahead of time.             Patient's Body mass index is 26.02 kg/m². indicating that he is overweight (BMI 25-29.9). Obesity-related health conditions include the following: none. Obesity is unchanged. BMI is is above average; BMI management plan is completed. We discussed portion control and increasing exercise..

## 2021-09-19 RX ORDER — METHYLPREDNISOLONE ACETATE 40 MG/ML
40 INJECTION, SUSPENSION INTRA-ARTICULAR; INTRALESIONAL; INTRAMUSCULAR; SOFT TISSUE
Status: COMPLETED | OUTPATIENT
Start: 2021-09-19 | End: 2021-09-19

## 2021-09-19 RX ADMIN — METHYLPREDNISOLONE ACETATE 40 MG: 40 INJECTION, SUSPENSION INTRA-ARTICULAR; INTRALESIONAL; INTRAMUSCULAR; SOFT TISSUE at 11:33

## 2021-10-28 ENCOUNTER — OFFICE VISIT (OUTPATIENT)
Dept: FAMILY MEDICINE CLINIC | Facility: CLINIC | Age: 61
End: 2021-10-28

## 2021-10-28 VITALS
OXYGEN SATURATION: 99 % | HEIGHT: 69 IN | BODY MASS INDEX: 26.84 KG/M2 | WEIGHT: 181.2 LBS | SYSTOLIC BLOOD PRESSURE: 124 MMHG | HEART RATE: 86 BPM | DIASTOLIC BLOOD PRESSURE: 68 MMHG | TEMPERATURE: 98.4 F

## 2021-10-28 DIAGNOSIS — J41.0 SIMPLE CHRONIC BRONCHITIS (HCC): ICD-10-CM

## 2021-10-28 DIAGNOSIS — M25.511 CHRONIC RIGHT SHOULDER PAIN: Primary | ICD-10-CM

## 2021-10-28 DIAGNOSIS — G89.29 CHRONIC RIGHT SHOULDER PAIN: Primary | ICD-10-CM

## 2021-10-28 DIAGNOSIS — E03.9 ACQUIRED HYPOTHYROIDISM: ICD-10-CM

## 2021-10-28 DIAGNOSIS — M79.2 NEUROPATHIC PAIN: ICD-10-CM

## 2021-10-28 PROCEDURE — 84443 ASSAY THYROID STIM HORMONE: CPT | Performed by: FAMILY MEDICINE

## 2021-10-28 PROCEDURE — 85025 COMPLETE CBC W/AUTO DIFF WBC: CPT | Performed by: FAMILY MEDICINE

## 2021-10-28 PROCEDURE — 99214 OFFICE O/P EST MOD 30 MIN: CPT | Performed by: FAMILY MEDICINE

## 2021-10-28 PROCEDURE — 90686 IIV4 VACC NO PRSV 0.5 ML IM: CPT | Performed by: FAMILY MEDICINE

## 2021-10-28 PROCEDURE — G0008 ADMIN INFLUENZA VIRUS VAC: HCPCS | Performed by: FAMILY MEDICINE

## 2021-10-28 PROCEDURE — 36415 COLL VENOUS BLD VENIPUNCTURE: CPT | Performed by: FAMILY MEDICINE

## 2021-10-28 PROCEDURE — 80053 COMPREHEN METABOLIC PANEL: CPT | Performed by: FAMILY MEDICINE

## 2021-10-28 RX ORDER — GABAPENTIN 300 MG/1
300 CAPSULE ORAL 3 TIMES DAILY
Qty: 90 CAPSULE | Refills: 2 | Status: SHIPPED | OUTPATIENT
Start: 2021-10-28 | End: 2022-01-28 | Stop reason: SDUPTHER

## 2021-10-28 RX ORDER — BUDESONIDE AND FORMOTEROL FUMARATE DIHYDRATE 160; 4.5 UG/1; UG/1
2 AEROSOL RESPIRATORY (INHALATION)
Qty: 10.2 G | Refills: 4 | Status: SHIPPED | OUTPATIENT
Start: 2021-10-28 | End: 2022-01-28 | Stop reason: SDUPTHER

## 2021-10-28 RX ORDER — ALBUTEROL SULFATE 90 UG/1
2 AEROSOL, METERED RESPIRATORY (INHALATION) EVERY 4 HOURS PRN
Qty: 18 G | Refills: 3 | Status: SHIPPED | OUTPATIENT
Start: 2021-10-28 | End: 2022-01-28 | Stop reason: SDUPTHER

## 2021-10-28 RX ORDER — LEVOTHYROXINE SODIUM 0.12 MG/1
125 TABLET ORAL DAILY
Qty: 90 TABLET | Refills: 2 | Status: SHIPPED | OUTPATIENT
Start: 2021-10-28 | End: 2022-01-28 | Stop reason: SDUPTHER

## 2021-10-28 NOTE — PROGRESS NOTES
Subjective   Hussein Roland is a 60 y.o. male.   Pt presents today with CC of Hypothyroidism      History of Present Illness   1.  Patient is a 60-year-old male here to follow-up on his shoulder pain.  He recently had steroid injection which helped significantly.  He suffers from chronic pain and debility mostly from his back.  He is followed with pain management the past.  His neuropathic pain is treated with gabapentin 300 mg 3 times a day.  He does well on this medication and would like refill.  He is up-to-date on urine drug screen and has a controlled substance agreement.\  #2 he has hypothyroidism for which he takes levothyroxine 125 mcg daily.  He is agreeable to blood work today.  #3 he has simple chronic bronchitis for which he takes Symbicort and albuterol.  He needs refill.              The following portions of the patient's history were reviewed and updated as appropriate: allergies, current medications, past family history, past medical history, past social history, past surgical history and problem list.    Review of Systems   Constitutional: Negative for chills, fever and unexpected weight loss.   HENT: Negative for congestion and sore throat.    Eyes: Negative for blurred vision and visual disturbance.   Respiratory: Negative for cough and wheezing.    Cardiovascular: Negative for chest pain and palpitations.   Gastrointestinal: Negative for abdominal pain and diarrhea.   Endocrine: Negative for cold intolerance and heat intolerance.   Genitourinary: Negative for dysuria.   Musculoskeletal: Negative for arthralgias and neck stiffness.   Neurological: Negative for dizziness, seizures and syncope.   Psychiatric/Behavioral: Negative for self-injury, suicidal ideas and depressed mood.       Objective   Physical Exam  Vitals and nursing note reviewed.   Constitutional:       Appearance: He is well-developed.   HENT:      Head: Normocephalic and atraumatic.      Right Ear: External ear normal.      Left  Ear: External ear normal.      Nose: Nose normal.   Eyes:      Conjunctiva/sclera: Conjunctivae normal.      Pupils: Pupils are equal, round, and reactive to light.   Cardiovascular:      Rate and Rhythm: Normal rate and regular rhythm.      Heart sounds: Normal heart sounds.   Pulmonary:      Effort: Pulmonary effort is normal.      Breath sounds: Normal breath sounds.   Abdominal:      General: Bowel sounds are normal.      Palpations: Abdomen is soft.   Musculoskeletal:      Cervical back: Normal range of motion and neck supple.   Skin:     General: Skin is warm and dry.   Neurological:      Mental Status: He is alert and oriented to person, place, and time.   Psychiatric:         Behavior: Behavior normal.           Assessment/Plan   Diagnoses and all orders for this visit:    1. Chronic right shoulder pain (Primary)  No concerns on exam today.  Injection seems to have been helpful.  He did not have any side effects to this medication.  He seems to do well historically with steroid injections.  If his back pain worsens may consider referral to local interventional pain specialist.  2. Acquired hypothyroidism  -     levothyroxine (SYNTHROID, LEVOTHROID) 125 MCG tablet; Take 1 tablet by mouth Daily.  Dispense: 90 tablet; Refill: 2  -     Comprehensive Metabolic Panel; Future  -     CBC Auto Differential; Future  -     TSH; Future    3. Simple chronic bronchitis (HCC)  -     budesonide-formoterol (SYMBICORT) 160-4.5 MCG/ACT inhaler; Inhale 2 puffs 2 (Two) Times a Day.  Dispense: 10.2 g; Refill: 4  -     albuterol sulfate  (90 Base) MCG/ACT inhaler; Inhale 2 puffs Every 4 (Four) Hours As Needed for Wheezing.  Dispense: 18 g; Refill: 3    4. Neuropathic pain  -     gabapentin (NEURONTIN) 300 MG capsule; Take 1 capsule by mouth 3 (Three) Times a Day.  Dispense: 90 capsule; Refill: 2                    Patient's Body mass index is 26.76 kg/m². indicating that he is overweight (BMI 25-29.9). Obesity-related  health conditions include the following: none. Obesity is unchanged. BMI is is above average; BMI management plan is completed. We discussed portion control and increasing exercise..

## 2021-10-29 LAB
ALBUMIN SERPL-MCNC: 4.4 G/DL (ref 3.5–5.2)
ALBUMIN/GLOB SERPL: 1.2 G/DL
ALP SERPL-CCNC: 83 U/L (ref 39–117)
ALT SERPL W P-5'-P-CCNC: 29 U/L (ref 1–41)
ANION GAP SERPL CALCULATED.3IONS-SCNC: 13 MMOL/L (ref 5–15)
AST SERPL-CCNC: 26 U/L (ref 1–40)
BASOPHILS # BLD AUTO: 0.05 10*3/MM3 (ref 0–0.2)
BASOPHILS NFR BLD AUTO: 0.5 % (ref 0–1.5)
BILIRUB SERPL-MCNC: 0.3 MG/DL (ref 0–1.2)
BUN SERPL-MCNC: 10 MG/DL (ref 6–20)
BUN/CREAT SERPL: 11.8 (ref 7–25)
CALCIUM SPEC-SCNC: 9.4 MG/DL (ref 8.6–10.5)
CHLORIDE SERPL-SCNC: 103 MMOL/L (ref 98–107)
CO2 SERPL-SCNC: 22 MMOL/L (ref 22–29)
CREAT SERPL-MCNC: 0.85 MG/DL (ref 0.76–1.27)
DEPRECATED RDW RBC AUTO: 46.9 FL (ref 37–54)
EOSINOPHIL # BLD AUTO: 0.18 10*3/MM3 (ref 0–0.4)
EOSINOPHIL NFR BLD AUTO: 2 % (ref 0.3–6.2)
ERYTHROCYTE [DISTWIDTH] IN BLOOD BY AUTOMATED COUNT: 14.6 % (ref 12.3–15.4)
GFR SERPL CREATININE-BSD FRML MDRD: 93 ML/MIN/1.73
GLOBULIN UR ELPH-MCNC: 3.6 GM/DL
GLUCOSE SERPL-MCNC: 103 MG/DL (ref 65–99)
HCT VFR BLD AUTO: 41.1 % (ref 37.5–51)
HGB BLD-MCNC: 14.2 G/DL (ref 13–17.7)
IMM GRANULOCYTES # BLD AUTO: 0.06 10*3/MM3 (ref 0–0.05)
IMM GRANULOCYTES NFR BLD AUTO: 0.7 % (ref 0–0.5)
LYMPHOCYTES # BLD AUTO: 2.94 10*3/MM3 (ref 0.7–3.1)
LYMPHOCYTES NFR BLD AUTO: 32 % (ref 19.6–45.3)
MCH RBC QN AUTO: 30.9 PG (ref 26.6–33)
MCHC RBC AUTO-ENTMCNC: 34.5 G/DL (ref 31.5–35.7)
MCV RBC AUTO: 89.3 FL (ref 79–97)
MONOCYTES # BLD AUTO: 1 10*3/MM3 (ref 0.1–0.9)
MONOCYTES NFR BLD AUTO: 10.9 % (ref 5–12)
NEUTROPHILS NFR BLD AUTO: 4.97 10*3/MM3 (ref 1.7–7)
NEUTROPHILS NFR BLD AUTO: 53.9 % (ref 42.7–76)
NRBC BLD AUTO-RTO: 0 /100 WBC (ref 0–0.2)
PLATELET # BLD AUTO: 284 10*3/MM3 (ref 140–450)
PMV BLD AUTO: 12.5 FL (ref 6–12)
POTASSIUM SERPL-SCNC: 4.5 MMOL/L (ref 3.5–5.2)
PROT SERPL-MCNC: 8 G/DL (ref 6–8.5)
RBC # BLD AUTO: 4.6 10*6/MM3 (ref 4.14–5.8)
SODIUM SERPL-SCNC: 138 MMOL/L (ref 136–145)
TSH SERPL DL<=0.05 MIU/L-ACNC: 2.03 UIU/ML (ref 0.27–4.2)
WBC # BLD AUTO: 9.2 10*3/MM3 (ref 3.4–10.8)

## 2022-01-28 ENCOUNTER — OFFICE VISIT (OUTPATIENT)
Dept: FAMILY MEDICINE CLINIC | Facility: CLINIC | Age: 62
End: 2022-01-28

## 2022-01-28 VITALS
HEART RATE: 77 BPM | HEIGHT: 69 IN | DIASTOLIC BLOOD PRESSURE: 68 MMHG | BODY MASS INDEX: 27.13 KG/M2 | WEIGHT: 183.2 LBS | OXYGEN SATURATION: 99 % | SYSTOLIC BLOOD PRESSURE: 122 MMHG | TEMPERATURE: 97.5 F

## 2022-01-28 DIAGNOSIS — M79.2 NEUROPATHIC PAIN: ICD-10-CM

## 2022-01-28 DIAGNOSIS — J41.0 SIMPLE CHRONIC BRONCHITIS: ICD-10-CM

## 2022-01-28 DIAGNOSIS — Z23 NEED FOR PNEUMOCOCCAL VACCINATION: ICD-10-CM

## 2022-01-28 DIAGNOSIS — Z71.89 ADVANCED DIRECTIVES, COUNSELING/DISCUSSION: ICD-10-CM

## 2022-01-28 DIAGNOSIS — M25.551 CHRONIC RIGHT HIP PAIN: ICD-10-CM

## 2022-01-28 DIAGNOSIS — Z00.00 MEDICARE ANNUAL WELLNESS VISIT, SUBSEQUENT: Primary | ICD-10-CM

## 2022-01-28 DIAGNOSIS — Q89.01 ASPLENIA: ICD-10-CM

## 2022-01-28 DIAGNOSIS — E03.9 ACQUIRED HYPOTHYROIDISM: ICD-10-CM

## 2022-01-28 DIAGNOSIS — G89.29 CHRONIC RIGHT HIP PAIN: ICD-10-CM

## 2022-01-28 PROCEDURE — G0439 PPPS, SUBSEQ VISIT: HCPCS | Performed by: FAMILY MEDICINE

## 2022-01-28 PROCEDURE — 1170F FXNL STATUS ASSESSED: CPT | Performed by: FAMILY MEDICINE

## 2022-01-28 PROCEDURE — 1159F MED LIST DOCD IN RCRD: CPT | Performed by: FAMILY MEDICINE

## 2022-01-28 PROCEDURE — 80053 COMPREHEN METABOLIC PANEL: CPT | Performed by: FAMILY MEDICINE

## 2022-01-28 PROCEDURE — 85025 COMPLETE CBC W/AUTO DIFF WBC: CPT | Performed by: FAMILY MEDICINE

## 2022-01-28 PROCEDURE — 36415 COLL VENOUS BLD VENIPUNCTURE: CPT | Performed by: FAMILY MEDICINE

## 2022-01-28 PROCEDURE — 84443 ASSAY THYROID STIM HORMONE: CPT | Performed by: FAMILY MEDICINE

## 2022-01-28 RX ORDER — GABAPENTIN 300 MG/1
300 CAPSULE ORAL 3 TIMES DAILY
Qty: 90 CAPSULE | Refills: 2 | Status: SHIPPED | OUTPATIENT
Start: 2022-01-28 | End: 2022-04-28 | Stop reason: SDUPTHER

## 2022-01-28 RX ORDER — BUDESONIDE AND FORMOTEROL FUMARATE DIHYDRATE 160; 4.5 UG/1; UG/1
2 AEROSOL RESPIRATORY (INHALATION)
Qty: 10.2 G | Refills: 4 | Status: SHIPPED | OUTPATIENT
Start: 2022-01-28 | End: 2022-04-28 | Stop reason: SDUPTHER

## 2022-01-28 RX ORDER — ALBUTEROL SULFATE 90 UG/1
2 AEROSOL, METERED RESPIRATORY (INHALATION) EVERY 4 HOURS PRN
Qty: 18 G | Refills: 3 | Status: SHIPPED | OUTPATIENT
Start: 2022-01-28 | End: 2022-04-28 | Stop reason: SDUPTHER

## 2022-01-28 RX ORDER — LEVOTHYROXINE SODIUM 0.12 MG/1
125 TABLET ORAL DAILY
Qty: 90 TABLET | Refills: 0 | Status: SHIPPED | OUTPATIENT
Start: 2022-01-28 | End: 2022-04-28 | Stop reason: SDUPTHER

## 2022-01-28 NOTE — PROGRESS NOTES
QUICK REFERENCE INFORMATION:  The ABCs of the Annual Wellness Visit    Subsequent Medicare Wellness Visit    HEALTH RISK ASSESSMENT    1960    Recent Hospitalizations:  No hospitalization(s) within the last year..        Current Medical Providers:  Patient Care Team:  Jesus Webster DO as PCP - General (Family Medicine)        Smoking Status:  Social History     Tobacco Use   Smoking Status Former Smoker   • Packs/day: 0.50   • Years: 8.00   • Pack years: 4.00   • Types: Cigarettes   • Quit date: 2020   • Years since quittin.9   Smokeless Tobacco Current User   • Types: Chew       Alcohol Consumption:  Social History     Substance and Sexual Activity   Alcohol Use No       Depression Screen:   PHQ-2/PHQ-9 Depression Screening 2022   Little interest or pleasure in doing things 0   Feeling down, depressed, or hopeless 0   Total Score 0       Health Habits and Functional and Cognitive Screening:  Functional & Cognitive Status 2022   Do you have difficulty preparing food and eating? Yes   Do you have difficulty bathing yourself, getting dressed or grooming yourself? Yes   Do you have difficulty using the toilet? Yes   Do you have difficulty moving around from place to place? Yes   Do you have trouble with steps or getting out of a bed or a chair? Yes   Current Diet Unhealthy Diet   Dental Exam Up to date   Eye Exam Up to date   Exercise (times per week) 3 times per week   Current Exercises Include Walking   Current Exercise Activities Include -   Do you need help using the phone?  No   Are you deaf or do you have serious difficulty hearing?  No   Do you need help with transportation? No   Do you need help shopping? No   Do you need help preparing meals?  Yes   Do you need help with housework?  No   Do you need help with laundry? No   Do you need help taking your medications? No   Do you need help managing money? No   Do you ever drive or ride in a car without wearing a seat belt? No    Problems all related to chronic R hip pain. Debility.         Does the patient have evidence of cognitive impairment? No    Aspirin use counseling: Does not need ASA (and currently is not on it)      Recent Lab Results:  CMP:  Lab Results   Component Value Date    BUN 10 10/28/2021    CREATININE 0.85 10/28/2021    EGFRIFNONA 93 10/28/2021    BCR 11.8 10/28/2021     10/28/2021    K 4.5 10/28/2021    CO2 22.0 10/28/2021    CALCIUM 9.4 10/28/2021    ALBUMIN 4.40 10/28/2021    BILITOT 0.3 10/28/2021    ALKPHOS 83 10/28/2021    AST 26 10/28/2021    ALT 29 10/28/2021     Lipid Panel:     HbA1c:       Visual Acuity:   Visual Acuity Screening    Right eye Left eye Both eyes   Without correction: 20/20 20/30 20/20   With correction:          Age-appropriate Screening Schedule:  Refer to the list below for future screening recommendations based on patient's age, sex and/or medical conditions. Orders for these recommended tests are listed in the plan section. The patient has been provided with a written plan.    Health Maintenance   Topic Date Due   • TDAP/TD VACCINES (1 - Tdap) Never done   • ZOSTER VACCINE (1 of 2) Never done   • INFLUENZA VACCINE  Completed        Subjective   History of Present Illness    Hussein Roland is a 61 y.o. male who presents for an Subsequent Wellness Visit.    The following portions of the patient's history were reviewed and updated as appropriate: allergies, current medications, past family history, past medical history, past social history, past surgical history and problem list.    Outpatient Medications Prior to Visit   Medication Sig Dispense Refill   • albuterol sulfate  (90 Base) MCG/ACT inhaler Inhale 2 puffs Every 4 (Four) Hours As Needed for Wheezing. 18 g 3   • budesonide-formoterol (SYMBICORT) 160-4.5 MCG/ACT inhaler Inhale 2 puffs 2 (Two) Times a Day. 10.2 g 4   • gabapentin (NEURONTIN) 300 MG capsule Take 1 capsule by mouth 3 (Three) Times a Day. 90 capsule 2   •  levothyroxine (SYNTHROID, LEVOTHROID) 125 MCG tablet Take 1 tablet by mouth Daily. 90 tablet 2     No facility-administered medications prior to visit.       Patient Active Problem List   Diagnosis   • Asplenia   • Simple chronic bronchitis (HCC)   • Leukocytosis   • Acquired hypothyroidism       Advance Care Planning:  ACP discussion was held with the patient during this visit. Patient does not have an advance directive, information provided.    Identification of Risk Factors:  Risk factors include: Advance Directive Discussion.    Review of Systems   Constitutional: Negative for chills, fever and unexpected weight loss.   HENT: Negative for congestion and sore throat.    Eyes: Negative for blurred vision and visual disturbance.   Respiratory: Negative for cough and wheezing.    Cardiovascular: Negative for chest pain and palpitations.   Gastrointestinal: Negative for abdominal pain and diarrhea.   Endocrine: Negative for cold intolerance and heat intolerance.   Genitourinary: Negative for dysuria.   Musculoskeletal: Positive for arthralgias. Negative for neck stiffness.   Neurological: Negative for dizziness, seizures and syncope.   Psychiatric/Behavioral: Negative for self-injury, suicidal ideas and depressed mood.       Compared to one year ago, the patient feels his physical health is the same.  Compared to one year ago, the patient feels his mental health is the same.    Objective     Physical Exam  Vitals and nursing note reviewed.   Constitutional:       Appearance: He is well-developed.   HENT:      Head: Normocephalic and atraumatic.      Right Ear: External ear normal.      Left Ear: External ear normal.      Nose: Nose normal.   Eyes:      Conjunctiva/sclera: Conjunctivae normal.      Pupils: Pupils are equal, round, and reactive to light.   Cardiovascular:      Rate and Rhythm: Normal rate and regular rhythm.      Heart sounds: Normal heart sounds.   Pulmonary:      Effort: Pulmonary effort is normal.  "     Breath sounds: Normal breath sounds.   Abdominal:      General: Bowel sounds are normal.      Palpations: Abdomen is soft.   Musculoskeletal:      Cervical back: Normal range of motion and neck supple.   Skin:     General: Skin is warm and dry.   Neurological:      Mental Status: He is alert and oriented to person, place, and time.   Psychiatric:         Behavior: Behavior normal.         Vitals:    01/28/22 0841   BP: 122/68   BP Location: Right arm   Patient Position: Sitting   Pulse: 77   Temp: 97.5 °F (36.4 °C)   TempSrc: Temporal   SpO2: 99%   Weight: 83.1 kg (183 lb 3.2 oz)   Height: 175.3 cm (69\")       Patient's Body mass index is 27.05 kg/m². indicating that he is overweight (BMI 25-29.9). Obesity-related health conditions include the following: hypertension and osteoarthritis. Obesity is unchanged. BMI is is above average; BMI management plan is completed. We discussed portion control and increasing exercise..      Assessment/Plan   Patient Self-Management and Personalized Health Advice  The patient has been provided with information about: diet and exercise and preventive services including:   · Annual Wellness Visit (AWV).    Visit Diagnoses:    ICD-10-CM ICD-9-CM   1. Medicare annual wellness visit, subsequent  Z00.00 V70.0   2. Acquired hypothyroidism  E03.9 244.9   3. Simple chronic bronchitis (HCC)  J41.0 491.0   4. Neuropathic pain  M79.2 729.2   5. Advanced directives, counseling/discussion  Z71.89 V65.49   6. Chronic right hip pain  M25.551 719.45    G89.29 338.29   7. Need for pneumococcal vaccination  Z23 V03.82   8. Asplenia  Q89.01 759.0   #1 no major concerns on Medicare wellness today other than his mobility from his chronic right hip pain.  He follows with orthopedics in [place] for this.  He gets regular injections and has had MRIs.  He walks with a cane currently.  Some days are worse than others.  The debility from his chronic hip pain makes it difficult to perform certain " activities of daily living.  # #4 he has neuropathic pain.  He takes gabapentin 300 mg 3 times a day.  Refill sent.  UDS pending today.  # #7 he had his spleen removed.  He is due for pneumococcal 23.  He is agreeable to have this done today    Orders Placed This Encounter   Procedures   • Comprehensive Metabolic Panel     Standing Status:   Future     Number of Occurrences:   1     Standing Expiration Date:   1/28/2023     Order Specific Question:   Release to patient     Answer:   Immediate   • CBC Auto Differential     Standing Status:   Future     Number of Occurrences:   1     Standing Expiration Date:   1/28/2023     Order Specific Question:   Release to patient     Answer:   Immediate   • TSH     Standing Status:   Future     Number of Occurrences:   1     Standing Expiration Date:   1/28/2023     Order Specific Question:   Release to patient     Answer:   Immediate       Outpatient Encounter Medications as of 1/28/2022   Medication Sig Dispense Refill   • albuterol sulfate  (90 Base) MCG/ACT inhaler Inhale 2 puffs Every 4 (Four) Hours As Needed for Wheezing. 18 g 3   • budesonide-formoterol (SYMBICORT) 160-4.5 MCG/ACT inhaler Inhale 2 puffs 2 (Two) Times a Day. 10.2 g 4   • gabapentin (NEURONTIN) 300 MG capsule Take 1 capsule by mouth 3 (Three) Times a Day. 90 capsule 2   • levothyroxine (SYNTHROID, LEVOTHROID) 125 MCG tablet Take 1 tablet by mouth Daily. 90 tablet 2     No facility-administered encounter medications on file as of 1/28/2022.       Reviewed use of high risk medication in the elderly: yes  Reviewed for potential of harmful drug interactions in the elderly: yes    Follow Up:  Return in about 3 months (around 4/28/2022) for Recheck.     An After Visit Summary and PPPS with all of these plans were given to the patient.

## 2022-01-29 LAB
ALBUMIN SERPL-MCNC: 4.1 G/DL (ref 3.5–5.2)
ALBUMIN/GLOB SERPL: 1.2 G/DL
ALP SERPL-CCNC: 76 U/L (ref 39–117)
ALT SERPL W P-5'-P-CCNC: 19 U/L (ref 1–41)
ANION GAP SERPL CALCULATED.3IONS-SCNC: 10.1 MMOL/L (ref 5–15)
AST SERPL-CCNC: 18 U/L (ref 1–40)
BASOPHILS # BLD AUTO: 0.05 10*3/MM3 (ref 0–0.2)
BASOPHILS NFR BLD AUTO: 0.5 % (ref 0–1.5)
BILIRUB SERPL-MCNC: 0.2 MG/DL (ref 0–1.2)
BUN SERPL-MCNC: 12 MG/DL (ref 6–20)
BUN/CREAT SERPL: 14.6 (ref 7–25)
CALCIUM SPEC-SCNC: 9.6 MG/DL (ref 8.6–10.5)
CHLORIDE SERPL-SCNC: 101 MMOL/L (ref 98–107)
CO2 SERPL-SCNC: 27.9 MMOL/L (ref 22–29)
CREAT SERPL-MCNC: 0.82 MG/DL (ref 0.76–1.27)
DEPRECATED RDW RBC AUTO: 49.4 FL (ref 37–54)
EOSINOPHIL # BLD AUTO: 0.18 10*3/MM3 (ref 0–0.4)
EOSINOPHIL NFR BLD AUTO: 1.8 % (ref 0.3–6.2)
ERYTHROCYTE [DISTWIDTH] IN BLOOD BY AUTOMATED COUNT: 14.2 % (ref 12.3–15.4)
GFR SERPL CREATININE-BSD FRML MDRD: 96 ML/MIN/1.73
GLOBULIN UR ELPH-MCNC: 3.4 GM/DL
GLUCOSE SERPL-MCNC: 108 MG/DL (ref 65–99)
HCT VFR BLD AUTO: 42.3 % (ref 37.5–51)
HGB BLD-MCNC: 13.9 G/DL (ref 13–17.7)
IMM GRANULOCYTES # BLD AUTO: 0.04 10*3/MM3 (ref 0–0.05)
IMM GRANULOCYTES NFR BLD AUTO: 0.4 % (ref 0–0.5)
LYMPHOCYTES # BLD AUTO: 3.59 10*3/MM3 (ref 0.7–3.1)
LYMPHOCYTES NFR BLD AUTO: 35.9 % (ref 19.6–45.3)
MCH RBC QN AUTO: 30.9 PG (ref 26.6–33)
MCHC RBC AUTO-ENTMCNC: 32.9 G/DL (ref 31.5–35.7)
MCV RBC AUTO: 94 FL (ref 79–97)
MONOCYTES # BLD AUTO: 1.14 10*3/MM3 (ref 0.1–0.9)
MONOCYTES NFR BLD AUTO: 11.4 % (ref 5–12)
NEUTROPHILS NFR BLD AUTO: 5.01 10*3/MM3 (ref 1.7–7)
NEUTROPHILS NFR BLD AUTO: 50 % (ref 42.7–76)
NRBC BLD AUTO-RTO: 0.1 /100 WBC (ref 0–0.2)
PLATELET # BLD AUTO: 303 10*3/MM3 (ref 140–450)
PMV BLD AUTO: 12.3 FL (ref 6–12)
POTASSIUM SERPL-SCNC: 4.4 MMOL/L (ref 3.5–5.2)
PROT SERPL-MCNC: 7.5 G/DL (ref 6–8.5)
RBC # BLD AUTO: 4.5 10*6/MM3 (ref 4.14–5.8)
SODIUM SERPL-SCNC: 139 MMOL/L (ref 136–145)
TSH SERPL DL<=0.05 MIU/L-ACNC: 1.01 UIU/ML (ref 0.27–4.2)
WBC NRBC COR # BLD: 10.01 10*3/MM3 (ref 3.4–10.8)

## 2022-02-07 ENCOUNTER — TELEPHONE (OUTPATIENT)
Dept: FAMILY MEDICINE CLINIC | Facility: CLINIC | Age: 62
End: 2022-02-07

## 2022-02-07 NOTE — TELEPHONE ENCOUNTER
----- Message from Jesus Webster DO sent at 2/6/2022  3:03 PM EST -----  No problems on his blood work.    LETTER MAILED.

## 2022-04-28 ENCOUNTER — OFFICE VISIT (OUTPATIENT)
Dept: FAMILY MEDICINE CLINIC | Facility: CLINIC | Age: 62
End: 2022-04-28

## 2022-04-28 VITALS
HEART RATE: 81 BPM | SYSTOLIC BLOOD PRESSURE: 128 MMHG | DIASTOLIC BLOOD PRESSURE: 84 MMHG | BODY MASS INDEX: 27.46 KG/M2 | OXYGEN SATURATION: 98 % | WEIGHT: 185.4 LBS | TEMPERATURE: 98.6 F | HEIGHT: 69 IN

## 2022-04-28 DIAGNOSIS — M79.2 NEUROPATHIC PAIN: ICD-10-CM

## 2022-04-28 DIAGNOSIS — Z12.11 COLON CANCER SCREENING: Primary | ICD-10-CM

## 2022-04-28 DIAGNOSIS — J41.0 SIMPLE CHRONIC BRONCHITIS: ICD-10-CM

## 2022-04-28 DIAGNOSIS — E03.9 ACQUIRED HYPOTHYROIDISM: ICD-10-CM

## 2022-04-28 PROCEDURE — 99214 OFFICE O/P EST MOD 30 MIN: CPT | Performed by: FAMILY MEDICINE

## 2022-04-28 RX ORDER — GABAPENTIN 300 MG/1
300 CAPSULE ORAL 3 TIMES DAILY
Qty: 90 CAPSULE | Refills: 2 | Status: SHIPPED | OUTPATIENT
Start: 2022-04-28 | End: 2022-07-28 | Stop reason: SDUPTHER

## 2022-04-28 RX ORDER — LEVOTHYROXINE SODIUM 0.12 MG/1
125 TABLET ORAL DAILY
Qty: 90 TABLET | Refills: 0 | Status: SHIPPED | OUTPATIENT
Start: 2022-04-28 | End: 2022-07-28 | Stop reason: SDUPTHER

## 2022-04-28 RX ORDER — ALBUTEROL SULFATE 90 UG/1
2 AEROSOL, METERED RESPIRATORY (INHALATION) EVERY 4 HOURS PRN
Qty: 18 G | Refills: 3 | Status: SHIPPED | OUTPATIENT
Start: 2022-04-28 | End: 2022-07-28 | Stop reason: SDUPTHER

## 2022-04-28 RX ORDER — BUDESONIDE AND FORMOTEROL FUMARATE DIHYDRATE 160; 4.5 UG/1; UG/1
2 AEROSOL RESPIRATORY (INHALATION)
Qty: 10.2 G | Refills: 4 | Status: SHIPPED | OUTPATIENT
Start: 2022-04-28 | End: 2022-07-28 | Stop reason: SDUPTHER

## 2022-04-28 NOTE — PROGRESS NOTES
Subjective   Hussein Roland is a 61 y.o. male.   Pt presents today with CC of Hypothyroidism      History of Present Illness   1.  Patient is a 61-year-old male here to follow-up on hypothyroidism.  His last blood work was normal.  He takes levothyroxine 125 mcg daily.  #2 he has simple chronic bronchitis for which he takes Symbicort and albuterol.  He is doing well on this regimen.  He quit smoking about 2 years ago.  He is still due for pneumococcal vaccine.  #3 he has neuropathic pain for which he takes gabapentin 300 mg 3 times a day.  He is doing well on this regimen.  UDS pending today.  #4 he is agreeable to colon cancer screening with Cologuard.           The following portions of the patient's history were reviewed and updated as appropriate: allergies, current medications, past family history, past medical history, past social history, past surgical history and problem list.    Review of Systems   Constitutional: Negative for chills, fever and unexpected weight loss.   HENT: Negative for congestion and sore throat.    Eyes: Negative for blurred vision and visual disturbance.   Respiratory: Negative for cough and wheezing.    Cardiovascular: Negative for chest pain and palpitations.   Gastrointestinal: Negative for abdominal pain and diarrhea.   Endocrine: Negative for cold intolerance and heat intolerance.   Genitourinary: Negative for dysuria.   Musculoskeletal: Negative for arthralgias and neck stiffness.   Neurological: Negative for dizziness, seizures and syncope.   Psychiatric/Behavioral: Negative for self-injury, suicidal ideas and depressed mood.       Objective   Physical Exam  Vitals and nursing note reviewed.   Constitutional:       Appearance: He is well-developed.   HENT:      Head: Normocephalic and atraumatic.      Right Ear: External ear normal.      Left Ear: External ear normal.      Nose: Nose normal.   Eyes:      Conjunctiva/sclera: Conjunctivae normal.      Pupils: Pupils are equal,  round, and reactive to light.   Cardiovascular:      Rate and Rhythm: Normal rate and regular rhythm.      Heart sounds: Normal heart sounds.   Pulmonary:      Effort: Pulmonary effort is normal.      Breath sounds: Normal breath sounds.   Abdominal:      General: Bowel sounds are normal.      Palpations: Abdomen is soft.   Musculoskeletal:      Cervical back: Normal range of motion and neck supple.   Skin:     General: Skin is warm and dry.   Neurological:      Mental Status: He is alert and oriented to person, place, and time.   Psychiatric:         Behavior: Behavior normal.           Assessment/Plan   Diagnoses and all orders for this visit:    1. Colon cancer screening (Primary)  -     Cologuard - Stool, Per Rectum; Future    2. Acquired hypothyroidism  -     levothyroxine (SYNTHROID, LEVOTHROID) 125 MCG tablet; Take 1 tablet by mouth Daily.  Dispense: 90 tablet; Refill: 0    3. Simple chronic bronchitis (HCC)  -     budesonide-formoterol (SYMBICORT) 160-4.5 MCG/ACT inhaler; Inhale 2 puffs 2 (Two) Times a Day.  Dispense: 10.2 g; Refill: 4  -     albuterol sulfate  (90 Base) MCG/ACT inhaler; Inhale 2 puffs Every 4 (Four) Hours As Needed for Wheezing.  Dispense: 18 g; Refill: 3    4. Neuropathic pain  -     gabapentin (NEURONTIN) 300 MG capsule; Take 1 capsule by mouth 3 (Three) Times a Day.  Dispense: 90 capsule; Refill: 2  UDS today.  Controlled substance agreement in his chart.                   BMI is >= 25 and < 30. (Overweight) The following options were offered after discussion: weight loss educational material (shared in after visit summary) and exercise counseling/recommendations

## 2022-06-02 ENCOUNTER — TELEPHONE (OUTPATIENT)
Dept: FAMILY MEDICINE CLINIC | Facility: CLINIC | Age: 62
End: 2022-06-02

## 2022-06-02 NOTE — TELEPHONE ENCOUNTER
----- Message from Jesus Webster DO sent at 6/2/2022  9:22 AM EDT -----  Cologuard was negative.  Recommend repeat Cologuard or colonoscopy in 3 years.        Patient notified & verbalized understanding.

## 2022-07-28 ENCOUNTER — OFFICE VISIT (OUTPATIENT)
Dept: FAMILY MEDICINE CLINIC | Facility: CLINIC | Age: 62
End: 2022-07-28

## 2022-07-28 VITALS
HEART RATE: 100 BPM | OXYGEN SATURATION: 95 % | DIASTOLIC BLOOD PRESSURE: 80 MMHG | SYSTOLIC BLOOD PRESSURE: 120 MMHG | RESPIRATION RATE: 19 BRPM | WEIGHT: 182 LBS | BODY MASS INDEX: 26.96 KG/M2 | TEMPERATURE: 97.1 F | HEIGHT: 69 IN

## 2022-07-28 DIAGNOSIS — E03.9 ACQUIRED HYPOTHYROIDISM: ICD-10-CM

## 2022-07-28 DIAGNOSIS — J41.0 SIMPLE CHRONIC BRONCHITIS: ICD-10-CM

## 2022-07-28 DIAGNOSIS — M79.2 NEUROPATHIC PAIN: ICD-10-CM

## 2022-07-28 PROCEDURE — 99213 OFFICE O/P EST LOW 20 MIN: CPT | Performed by: FAMILY MEDICINE

## 2022-07-28 RX ORDER — ALBUTEROL SULFATE 90 UG/1
2 AEROSOL, METERED RESPIRATORY (INHALATION) EVERY 4 HOURS PRN
Qty: 18 G | Refills: 3 | Status: SHIPPED | OUTPATIENT
Start: 2022-07-28 | End: 2022-10-28 | Stop reason: SDUPTHER

## 2022-07-28 RX ORDER — BUDESONIDE AND FORMOTEROL FUMARATE DIHYDRATE 160; 4.5 UG/1; UG/1
2 AEROSOL RESPIRATORY (INHALATION)
Qty: 10.2 G | Refills: 4 | Status: SHIPPED | OUTPATIENT
Start: 2022-07-28 | End: 2022-10-28 | Stop reason: SDUPTHER

## 2022-07-28 RX ORDER — GABAPENTIN 300 MG/1
300 CAPSULE ORAL 3 TIMES DAILY
Qty: 270 CAPSULE | Refills: 0 | Status: SHIPPED | OUTPATIENT
Start: 2022-07-28 | End: 2022-10-28 | Stop reason: SDUPTHER

## 2022-07-28 RX ORDER — LEVOTHYROXINE SODIUM 0.12 MG/1
125 TABLET ORAL DAILY
Qty: 90 TABLET | Refills: 0 | Status: SHIPPED | OUTPATIENT
Start: 2022-07-28 | End: 2022-10-28 | Stop reason: SDUPTHER

## 2022-07-28 NOTE — PROGRESS NOTES
Subjective   Hussein Roland is a 61 y.o. male.   Pt presents today with CC of Hypothyroidism      History of Present Illness   History of Present Illness     The following portions of the patient's history were reviewed and updated as appropriate: allergies, current medications, past family history, past medical history, past social history, past surgical history and problem list.    Review of Systems   Constitutional: Negative for chills, fever and unexpected weight loss.   HENT: Negative for congestion and sore throat.    Eyes: Negative for blurred vision and visual disturbance.   Respiratory: Negative for cough and wheezing.    Cardiovascular: Negative for chest pain and palpitations.   Gastrointestinal: Negative for abdominal pain and diarrhea.   Endocrine: Negative for cold intolerance and heat intolerance.   Genitourinary: Negative for dysuria.   Musculoskeletal: Negative for arthralgias and neck stiffness.   Neurological: Negative for dizziness, seizures and syncope.   Psychiatric/Behavioral: Negative for self-injury, suicidal ideas and depressed mood.       Objective   Physical Exam  Vitals and nursing note reviewed.   Constitutional:       Appearance: He is well-developed.   HENT:      Head: Normocephalic and atraumatic.      Right Ear: External ear normal.      Left Ear: External ear normal.      Nose: Nose normal.   Eyes:      Conjunctiva/sclera: Conjunctivae normal.      Pupils: Pupils are equal, round, and reactive to light.   Cardiovascular:      Rate and Rhythm: Normal rate and regular rhythm.      Heart sounds: Normal heart sounds.   Pulmonary:      Effort: Pulmonary effort is normal.      Breath sounds: Normal breath sounds.   Abdominal:      General: Bowel sounds are normal.      Palpations: Abdomen is soft.   Musculoskeletal:      Cervical back: Normal range of motion and neck supple.   Skin:     General: Skin is warm and dry.   Neurological:      Mental Status: He is alert and oriented to person,  place, and time.   Psychiatric:         Behavior: Behavior normal.           Assessment & Plan   Diagnoses and all orders for this visit:    1. Acquired hypothyroidism  -     levothyroxine (SYNTHROID, LEVOTHROID) 125 MCG tablet; Take 1 tablet by mouth Daily.  Dispense: 90 tablet; Refill: 0  His last TSH was about 6 months ago.  Was normal.  In the interest of cost, he would like to wait until follow-up for rechecking.  This is reasonable.  2. Simple chronic bronchitis (HCC)  -     budesonide-formoterol (SYMBICORT) 160-4.5 MCG/ACT inhaler; Inhale 2 puffs 2 (Two) Times a Day.  Dispense: 10.2 g; Refill: 4  -     albuterol sulfate  (90 Base) MCG/ACT inhaler; Inhale 2 puffs Every 4 (Four) Hours As Needed for Wheezing.  Dispense: 18 g; Refill: 3    3. Neuropathic pain  -     gabapentin (NEURONTIN) 300 MG capsule; Take 1 capsule by mouth 3 (Three) Times a Day.  Dispense: 270 capsule; Refill: 0  UDS is up-to-date, controlled substance agreement in his chart.  Prescription sent.  We will get follow-up UDS at follow-up.                BMI is >= 25 and <30. (Overweight) The following options were offered after discussion;: exercise counseling/recommendations and nutrition counseling/recommendations

## 2022-10-28 ENCOUNTER — OFFICE VISIT (OUTPATIENT)
Dept: FAMILY MEDICINE CLINIC | Facility: CLINIC | Age: 62
End: 2022-10-28

## 2022-10-28 VITALS
BODY MASS INDEX: 26.96 KG/M2 | SYSTOLIC BLOOD PRESSURE: 138 MMHG | HEART RATE: 85 BPM | HEIGHT: 69 IN | DIASTOLIC BLOOD PRESSURE: 72 MMHG | WEIGHT: 182 LBS | TEMPERATURE: 97.8 F | OXYGEN SATURATION: 96 %

## 2022-10-28 DIAGNOSIS — M79.2 NEUROPATHIC PAIN: ICD-10-CM

## 2022-10-28 DIAGNOSIS — E78.1 HYPERTRIGLYCERIDEMIA: ICD-10-CM

## 2022-10-28 DIAGNOSIS — E03.9 ACQUIRED HYPOTHYROIDISM: ICD-10-CM

## 2022-10-28 DIAGNOSIS — Z23 NEED FOR INFLUENZA VACCINATION: Primary | ICD-10-CM

## 2022-10-28 DIAGNOSIS — J41.0 SIMPLE CHRONIC BRONCHITIS: ICD-10-CM

## 2022-10-28 PROCEDURE — 99214 OFFICE O/P EST MOD 30 MIN: CPT | Performed by: FAMILY MEDICINE

## 2022-10-28 PROCEDURE — G0008 ADMIN INFLUENZA VIRUS VAC: HCPCS | Performed by: FAMILY MEDICINE

## 2022-10-28 PROCEDURE — 90686 IIV4 VACC NO PRSV 0.5 ML IM: CPT | Performed by: FAMILY MEDICINE

## 2022-10-28 RX ORDER — LEVOTHYROXINE SODIUM 0.12 MG/1
125 TABLET ORAL DAILY
Qty: 90 TABLET | Refills: 0 | Status: SHIPPED | OUTPATIENT
Start: 2022-10-28 | End: 2022-11-21 | Stop reason: SDUPTHER

## 2022-10-28 RX ORDER — BUDESONIDE AND FORMOTEROL FUMARATE DIHYDRATE 160; 4.5 UG/1; UG/1
2 AEROSOL RESPIRATORY (INHALATION)
Qty: 10.2 G | Refills: 4 | Status: SHIPPED | OUTPATIENT
Start: 2022-10-28 | End: 2023-01-27 | Stop reason: SDUPTHER

## 2022-10-28 RX ORDER — GABAPENTIN 300 MG/1
300 CAPSULE ORAL 3 TIMES DAILY
Qty: 270 CAPSULE | Refills: 0 | Status: SHIPPED | OUTPATIENT
Start: 2022-10-28 | End: 2023-01-27 | Stop reason: SDUPTHER

## 2022-10-28 RX ORDER — ALBUTEROL SULFATE 90 UG/1
2 AEROSOL, METERED RESPIRATORY (INHALATION) EVERY 4 HOURS PRN
Qty: 18 G | Refills: 3 | Status: SHIPPED | OUTPATIENT
Start: 2022-10-28 | End: 2023-01-27 | Stop reason: SDUPTHER

## 2022-10-28 NOTE — PROGRESS NOTES
Subjective   Hussein Roland is a 61 y.o. male.   Pt presents today with CC of Follow-up (3 month) and Acquired hypothyroidism      History of Present Illness   History of Present Illness  1.  Patient is a 61-year-old male here to follow-up on hypothyroidism.  He takes levothyroxine 125 mcg daily.  He has no problems with his current dosing.  #2 he has history of hypertriglyceridemia with hypercholesterolemia.  He is fasting today.  He is agreeable to blood work.  #3 he would like a flu shot.  #4 he has neuropathic pain for which he takes gabapentin 300 mg 3 times a day.  UDS was appropriate in the spring.  #4 he has chronic bronchitis for which he takes albuterol and Symbicort.  He is stable on his current regimen.           The following portions of the patient's history were reviewed and updated as appropriate: allergies, current medications, past family history, past medical history, past social history, past surgical history and problem list.    Review of Systems   Constitutional: Negative for chills, fever and unexpected weight loss.   HENT: Negative for congestion and sore throat.    Eyes: Negative for blurred vision and visual disturbance.   Respiratory: Negative for cough and wheezing.    Cardiovascular: Negative for chest pain and palpitations.   Gastrointestinal: Negative for abdominal pain and diarrhea.   Endocrine: Negative for cold intolerance and heat intolerance.   Genitourinary: Negative for dysuria.   Musculoskeletal: Negative for arthralgias and neck stiffness.   Neurological: Negative for dizziness, seizures and syncope.   Psychiatric/Behavioral: Negative for self-injury, suicidal ideas and depressed mood.       Objective   Physical Exam  Vitals and nursing note reviewed.   Constitutional:       Appearance: He is well-developed.   HENT:      Head: Normocephalic and atraumatic.      Right Ear: External ear normal.      Left Ear: External ear normal.      Nose: Nose normal.   Eyes:       Conjunctiva/sclera: Conjunctivae normal.      Pupils: Pupils are equal, round, and reactive to light.   Cardiovascular:      Rate and Rhythm: Normal rate and regular rhythm.      Heart sounds: Normal heart sounds.   Pulmonary:      Effort: Pulmonary effort is normal.      Breath sounds: Normal breath sounds.   Abdominal:      General: Bowel sounds are normal.      Palpations: Abdomen is soft.   Musculoskeletal:      Cervical back: Normal range of motion and neck supple.   Skin:     General: Skin is warm and dry.   Neurological:      Mental Status: He is alert and oriented to person, place, and time.   Psychiatric:         Behavior: Behavior normal.           Assessment & Plan   Diagnoses and all orders for this visit:    1. Need for influenza vaccination (Primary)    2. Simple chronic bronchitis (HCC)  -     albuterol sulfate  (90 Base) MCG/ACT inhaler; Inhale 2 puffs Every 4 (Four) Hours As Needed for Wheezing.  Dispense: 18 g; Refill: 3  -     budesonide-formoterol (SYMBICORT) 160-4.5 MCG/ACT inhaler; Inhale 2 puffs 2 (Two) Times a Day.  Dispense: 10.2 g; Refill: 4    3. Neuropathic pain  -     gabapentin (NEURONTIN) 300 MG capsule; Take 1 capsule by mouth 3 (Three) Times a Day.  Dispense: 270 capsule; Refill: 0  Will be due for UDS at follow-up.  4. Acquired hypothyroidism  -     levothyroxine (SYNTHROID, LEVOTHROID) 125 MCG tablet; Take 1 tablet by mouth Daily.  Dispense: 90 tablet; Refill: 0  -     Comprehensive metabolic panel; Future  -     TSH; Future  -     CBC No Differential; Future    5. Hypertriglyceridemia  -     Lipid panel; Future  We will recheck his cholesterol to see if he would be benefited by cholesterol-lowering medication.  Other orders  -     FluLaval/Fluzone >6 mos ([id number])               Hussein Roland  reports that he quit smoking about 2 years ago. His smoking use included cigarettes. He has a 4.00 pack-year smoking history. His smokeless tobacco use includes chew.. I have  educated him on the risk of diseases from using tobacco products such as cancer, COPD and heart disease.     I advised him to quit and he is not willing to quit.    I spent 3  minutes counseling the patient.         BMI is >= 25 and <30. (Overweight) The following options were offered after discussion;: exercise counseling/recommendations and nutrition counseling/recommendations

## 2022-11-11 ENCOUNTER — TELEPHONE (OUTPATIENT)
Dept: FAMILY MEDICINE CLINIC | Facility: CLINIC | Age: 62
End: 2022-11-11

## 2022-11-11 NOTE — TELEPHONE ENCOUNTER
Spoke with patient in regards to overdue lab orders.  He indicated he plans to come in soon to have them drawn.

## 2022-11-15 ENCOUNTER — LAB (OUTPATIENT)
Dept: FAMILY MEDICINE CLINIC | Facility: CLINIC | Age: 62
End: 2022-11-15

## 2022-11-16 ENCOUNTER — LAB (OUTPATIENT)
Dept: FAMILY MEDICINE CLINIC | Facility: CLINIC | Age: 62
End: 2022-11-16

## 2022-11-16 DIAGNOSIS — E78.1 HYPERTRIGLYCERIDEMIA: ICD-10-CM

## 2022-11-16 DIAGNOSIS — E03.9 ACQUIRED HYPOTHYROIDISM: ICD-10-CM

## 2022-11-16 LAB
ALBUMIN SERPL-MCNC: 3.9 G/DL (ref 3.5–5.2)
ALBUMIN/GLOB SERPL: 1 G/DL
ALP SERPL-CCNC: 81 U/L (ref 39–117)
ALT SERPL W P-5'-P-CCNC: 34 U/L (ref 1–41)
ANION GAP SERPL CALCULATED.3IONS-SCNC: 13.4 MMOL/L (ref 5–15)
AST SERPL-CCNC: 38 U/L (ref 1–40)
BILIRUB SERPL-MCNC: 0.5 MG/DL (ref 0–1.2)
BUN SERPL-MCNC: 12 MG/DL (ref 6–20)
BUN/CREAT SERPL: 13.6 (ref 7–25)
CALCIUM SPEC-SCNC: 9.7 MG/DL (ref 8.6–10.5)
CHLORIDE SERPL-SCNC: 103 MMOL/L (ref 98–107)
CHOLEST SERPL-MCNC: 171 MG/DL (ref 0–200)
CO2 SERPL-SCNC: 19.6 MMOL/L (ref 22–29)
CREAT SERPL-MCNC: 0.88 MG/DL (ref 0.76–1.27)
EGFRCR SERPLBLD CKD-EPI 2021: 99.1 ML/MIN/1.73
GLOBULIN UR ELPH-MCNC: 3.8 GM/DL
GLUCOSE SERPL-MCNC: 83 MG/DL (ref 65–99)
HDLC SERPL-MCNC: 36 MG/DL (ref 40–60)
LDLC SERPL CALC-MCNC: 87 MG/DL (ref 0–100)
LDLC/HDLC SERPL: 2.16 {RATIO}
POTASSIUM SERPL-SCNC: 5 MMOL/L (ref 3.5–5.2)
PROT SERPL-MCNC: 7.7 G/DL (ref 6–8.5)
SODIUM SERPL-SCNC: 136 MMOL/L (ref 136–145)
TRIGL SERPL-MCNC: 287 MG/DL (ref 0–150)
TSH SERPL DL<=0.05 MIU/L-ACNC: 0.24 UIU/ML (ref 0.27–4.2)
VLDLC SERPL-MCNC: 48 MG/DL (ref 5–40)

## 2022-11-16 PROCEDURE — 80061 LIPID PANEL: CPT | Performed by: FAMILY MEDICINE

## 2022-11-16 PROCEDURE — 80053 COMPREHEN METABOLIC PANEL: CPT | Performed by: FAMILY MEDICINE

## 2022-11-16 PROCEDURE — 84443 ASSAY THYROID STIM HORMONE: CPT | Performed by: FAMILY MEDICINE

## 2022-11-21 ENCOUNTER — TELEPHONE (OUTPATIENT)
Dept: FAMILY MEDICINE CLINIC | Facility: CLINIC | Age: 62
End: 2022-11-21

## 2022-11-21 DIAGNOSIS — E03.9 ACQUIRED HYPOTHYROIDISM: ICD-10-CM

## 2022-11-21 RX ORDER — LEVOTHYROXINE SODIUM 112 UG/1
112 TABLET ORAL DAILY
Qty: 90 TABLET | Refills: 0 | Status: SHIPPED | OUTPATIENT
Start: 2022-11-21 | End: 2023-01-27 | Stop reason: SDUPTHER

## 2022-11-21 NOTE — TELEPHONE ENCOUNTER
----- Message from Jesus Webster, DO sent at 11/21/2022 10:18 AM EST -----  I reviewed your blood work.  No concerns except for your TSH was too low.  This suggests that your current dose of levothyroxine 125 mcg daily, is a little too high.  I recommend decreasing the dose down to 112 mcg daily.  Prescription was sent.  We will recheck at follow-up.        Spoke with patient. Verbally understood, no farther questions.

## 2023-01-27 ENCOUNTER — OFFICE VISIT (OUTPATIENT)
Dept: FAMILY MEDICINE CLINIC | Facility: CLINIC | Age: 63
End: 2023-01-27
Payer: MEDICARE

## 2023-01-27 VITALS
OXYGEN SATURATION: 98 % | RESPIRATION RATE: 20 BRPM | HEIGHT: 69 IN | BODY MASS INDEX: 27.49 KG/M2 | DIASTOLIC BLOOD PRESSURE: 76 MMHG | WEIGHT: 185.6 LBS | HEART RATE: 86 BPM | SYSTOLIC BLOOD PRESSURE: 128 MMHG | TEMPERATURE: 97.3 F

## 2023-01-27 DIAGNOSIS — J30.2 SEASONAL ALLERGIES: Primary | ICD-10-CM

## 2023-01-27 DIAGNOSIS — E03.9 ACQUIRED HYPOTHYROIDISM: ICD-10-CM

## 2023-01-27 DIAGNOSIS — J41.0 SIMPLE CHRONIC BRONCHITIS: ICD-10-CM

## 2023-01-27 DIAGNOSIS — M79.2 NEUROPATHIC PAIN: ICD-10-CM

## 2023-01-27 LAB
ALBUMIN SERPL-MCNC: 4.5 G/DL (ref 3.5–5.2)
ALBUMIN/GLOB SERPL: 1.4 G/DL
ALP SERPL-CCNC: 79 U/L (ref 39–117)
ALT SERPL W P-5'-P-CCNC: 23 U/L (ref 1–41)
ANION GAP SERPL CALCULATED.3IONS-SCNC: 12.3 MMOL/L (ref 5–15)
AST SERPL-CCNC: 20 U/L (ref 1–40)
BILIRUB SERPL-MCNC: 0.3 MG/DL (ref 0–1.2)
BUN SERPL-MCNC: 12 MG/DL (ref 8–23)
BUN/CREAT SERPL: 12.4 (ref 7–25)
CALCIUM SPEC-SCNC: 9.4 MG/DL (ref 8.6–10.5)
CHLORIDE SERPL-SCNC: 102 MMOL/L (ref 98–107)
CO2 SERPL-SCNC: 22.7 MMOL/L (ref 22–29)
CREAT SERPL-MCNC: 0.97 MG/DL (ref 0.76–1.27)
DEPRECATED RDW RBC AUTO: 46 FL (ref 37–54)
EGFRCR SERPLBLD CKD-EPI 2021: 89.4 ML/MIN/1.73
ERYTHROCYTE [DISTWIDTH] IN BLOOD BY AUTOMATED COUNT: 14.1 % (ref 12.3–15.4)
GLOBULIN UR ELPH-MCNC: 3.3 GM/DL
GLUCOSE SERPL-MCNC: 102 MG/DL (ref 65–99)
HCT VFR BLD AUTO: 42.1 % (ref 37.5–51)
HGB BLD-MCNC: 14.6 G/DL (ref 13–17.7)
MCH RBC QN AUTO: 30.9 PG (ref 26.6–33)
MCHC RBC AUTO-ENTMCNC: 34.7 G/DL (ref 31.5–35.7)
MCV RBC AUTO: 89 FL (ref 79–97)
PLATELET # BLD AUTO: 325 10*3/MM3 (ref 140–450)
PMV BLD AUTO: 12.2 FL (ref 6–12)
POTASSIUM SERPL-SCNC: 4.4 MMOL/L (ref 3.5–5.2)
PROT SERPL-MCNC: 7.8 G/DL (ref 6–8.5)
RBC # BLD AUTO: 4.73 10*6/MM3 (ref 4.14–5.8)
SODIUM SERPL-SCNC: 137 MMOL/L (ref 136–145)
TSH SERPL DL<=0.05 MIU/L-ACNC: 1.29 UIU/ML (ref 0.27–4.2)
WBC NRBC COR # BLD: 10.81 10*3/MM3 (ref 3.4–10.8)

## 2023-01-27 PROCEDURE — 85027 COMPLETE CBC AUTOMATED: CPT | Performed by: FAMILY MEDICINE

## 2023-01-27 PROCEDURE — 84443 ASSAY THYROID STIM HORMONE: CPT | Performed by: FAMILY MEDICINE

## 2023-01-27 PROCEDURE — 80053 COMPREHEN METABOLIC PANEL: CPT | Performed by: FAMILY MEDICINE

## 2023-01-27 PROCEDURE — 99214 OFFICE O/P EST MOD 30 MIN: CPT | Performed by: FAMILY MEDICINE

## 2023-01-27 RX ORDER — BUDESONIDE AND FORMOTEROL FUMARATE DIHYDRATE 160; 4.5 UG/1; UG/1
2 AEROSOL RESPIRATORY (INHALATION)
Qty: 10.2 G | Refills: 4 | Status: SHIPPED | OUTPATIENT
Start: 2023-01-27

## 2023-01-27 RX ORDER — GABAPENTIN 300 MG/1
300 CAPSULE ORAL 3 TIMES DAILY
Qty: 270 CAPSULE | Refills: 0 | Status: SHIPPED | OUTPATIENT
Start: 2023-01-27

## 2023-01-27 RX ORDER — LEVOTHYROXINE SODIUM 112 UG/1
112 TABLET ORAL DAILY
Qty: 90 TABLET | Refills: 0 | Status: SHIPPED | OUTPATIENT
Start: 2023-01-27

## 2023-01-27 RX ORDER — ALBUTEROL SULFATE 90 UG/1
2 AEROSOL, METERED RESPIRATORY (INHALATION) EVERY 4 HOURS PRN
Qty: 18 G | Refills: 3 | Status: SHIPPED | OUTPATIENT
Start: 2023-01-27

## 2023-01-27 NOTE — PROGRESS NOTES
Subjective   Hussein Roland is a 62 y.o. male.   Pt presents today with CC of Hypothyroidism      History of Present Illness   History of Present Illness  1.  Patient is a 62-year-old male here to follow-up on hypothyroidism.  He was previously taking 125 mcg, his TSH was too low back in November, his levothyroxine was decreased down to 112 mcg daily.  He reports that he feels the same.  He is agreeable to blood work today  #2 he has neuropathic pain for which he takes gabapentin 300 mg 3 times a day.  He is agreeable to blood work today as well as UDS.  #3 he has chronic bronchitis for which he takes Symbicort and albuterol.  He also takes an antihistamine for allergies.      Hypothyroidism  Pertinent negatives include no abdominal pain, arthralgias, chest pain, chills, congestion, coughing, fever or sore throat.        The following portions of the patient's history were reviewed and updated as appropriate: allergies, current medications, past family history, past medical history, past social history, past surgical history and problem list.    Review of Systems   Constitutional: Negative for chills, fever and unexpected weight loss.   HENT: Negative for congestion and sore throat.    Eyes: Negative for blurred vision and visual disturbance.   Respiratory: Negative for cough and wheezing.    Cardiovascular: Negative for chest pain and palpitations.   Gastrointestinal: Negative for abdominal pain and diarrhea.   Endocrine: Negative for cold intolerance and heat intolerance.   Genitourinary: Negative for dysuria.   Musculoskeletal: Negative for arthralgias and neck stiffness.   Neurological: Negative for dizziness, seizures and syncope.   Psychiatric/Behavioral: Negative for self-injury, suicidal ideas and depressed mood.       Objective   Physical Exam  Vitals and nursing note reviewed.   Constitutional:       Appearance: He is well-developed.   HENT:      Head: Normocephalic and atraumatic.      Right Ear: External  ear normal.      Left Ear: External ear normal.      Nose: Nose normal.   Eyes:      Conjunctiva/sclera: Conjunctivae normal.      Pupils: Pupils are equal, round, and reactive to light.   Cardiovascular:      Rate and Rhythm: Normal rate and regular rhythm.      Heart sounds: Normal heart sounds.   Pulmonary:      Effort: Pulmonary effort is normal.      Breath sounds: Normal breath sounds.   Abdominal:      General: Bowel sounds are normal.      Palpations: Abdomen is soft.   Musculoskeletal:      Cervical back: Normal range of motion and neck supple.   Skin:     General: Skin is warm and dry.   Neurological:      Mental Status: He is alert and oriented to person, place, and time.   Psychiatric:         Behavior: Behavior normal.           Assessment & Plan   Diagnoses and all orders for this visit:    1. Seasonal allergies (Primary)    2. Neuropathic pain  -     gabapentin (NEURONTIN) 300 MG capsule; Take 1 capsule by mouth 3 (Three) Times a Day.  Dispense: 270 capsule; Refill: 0  UDS today.  Follow-up in 3 months.  3. Acquired hypothyroidism  -     levothyroxine (SYNTHROID, LEVOTHROID) 112 MCG tablet; Take 1 tablet by mouth Daily.  Dispense: 90 tablet; Refill: 0  -     TSH; Future  -     Comprehensive metabolic panel; Future  -     CBC No Differential; Future  Adjusted his levothyroxine over a month ago.  Getting follow-up TSH to confirm normal level.  He reports that he has felt the same.  4. Simple chronic bronchitis (HCC)  -     budesonide-formoterol (SYMBICORT) 160-4.5 MCG/ACT inhaler; Inhale 2 puffs 2 (Two) Times a Day.  Dispense: 10.2 g; Refill: 4  -     albuterol sulfate  (90 Base) MCG/ACT inhaler; Inhale 2 puffs Every 4 (Four) Hours As Needed for Wheezing.  Dispense: 18 g; Refill: 3  Continue antihistamine as well as his current regimen.  He is a former smoker.  He quit nearly 3 years ago              BMI is >= 25 and <30. (Overweight) The following options were offered after discussion;: exercise  counseling/recommendations and nutrition counseling/recommendations          This document has been electronically signed by Jesus Webster DO  January 27, 2023 10:19 EST    Dictated Utilizing Dragon Dictation: Part of this note may be an electronic transcription/translation of spoken language to printed text using the Dragon Dictation System.

## 2023-01-31 ENCOUNTER — TELEPHONE (OUTPATIENT)
Dept: FAMILY MEDICINE CLINIC | Facility: CLINIC | Age: 63
End: 2023-01-31
Payer: MEDICARE

## 2023-01-31 NOTE — TELEPHONE ENCOUNTER
----- Message from Jesus Webster DO sent at 1/31/2023 12:07 PM EST -----  Blood work looks good.  No concerns        Not available at this time.

## 2023-05-11 ENCOUNTER — OFFICE VISIT (OUTPATIENT)
Dept: FAMILY MEDICINE CLINIC | Facility: CLINIC | Age: 63
End: 2023-05-11
Payer: MEDICARE

## 2023-05-11 VITALS
HEART RATE: 76 BPM | HEIGHT: 69 IN | DIASTOLIC BLOOD PRESSURE: 78 MMHG | WEIGHT: 178.8 LBS | OXYGEN SATURATION: 97 % | SYSTOLIC BLOOD PRESSURE: 130 MMHG | BODY MASS INDEX: 26.48 KG/M2 | TEMPERATURE: 97.6 F

## 2023-05-11 DIAGNOSIS — M25.512 ACUTE PAIN OF LEFT SHOULDER: Primary | ICD-10-CM

## 2023-05-11 DIAGNOSIS — M79.2 NEUROPATHIC PAIN: ICD-10-CM

## 2023-05-11 DIAGNOSIS — Z02.9 ADMINISTRATIVE ENCOUNTER: ICD-10-CM

## 2023-05-11 DIAGNOSIS — J41.0 SIMPLE CHRONIC BRONCHITIS: ICD-10-CM

## 2023-05-11 DIAGNOSIS — N39.41 URGE INCONTINENCE OF URINE: ICD-10-CM

## 2023-05-11 DIAGNOSIS — E03.9 ACQUIRED HYPOTHYROIDISM: ICD-10-CM

## 2023-05-11 RX ORDER — GABAPENTIN 300 MG/1
300 CAPSULE ORAL 3 TIMES DAILY
Qty: 270 CAPSULE | Refills: 0 | Status: SHIPPED | OUTPATIENT
Start: 2023-05-11

## 2023-05-11 RX ORDER — LEVOTHYROXINE SODIUM 112 UG/1
112 TABLET ORAL DAILY
Qty: 90 TABLET | Refills: 0 | Status: SHIPPED | OUTPATIENT
Start: 2023-05-11

## 2023-05-11 RX ORDER — ALBUTEROL SULFATE 90 UG/1
2 AEROSOL, METERED RESPIRATORY (INHALATION) EVERY 4 HOURS PRN
Qty: 18 G | Refills: 3 | Status: SHIPPED | OUTPATIENT
Start: 2023-05-11

## 2023-05-11 RX ORDER — BUDESONIDE AND FORMOTEROL FUMARATE DIHYDRATE 160; 4.5 UG/1; UG/1
2 AEROSOL RESPIRATORY (INHALATION)
Qty: 10.2 G | Refills: 4 | Status: SHIPPED | OUTPATIENT
Start: 2023-05-11

## 2023-05-11 NOTE — PROGRESS NOTES
Subjective   Hussein Roland is a 62 y.o. male.   Pt presents today with CC of Hypothyroidism      History of Present Illness   History of Present Illness  1.  Patient is a 62-year-old male here to follow-up on hypothyroidism.  His last TSH was normal.  He takes levothyroxine 112 mcg daily  #2 he complains of left shoulder pain.  He reports the pain as around the tip of the acromion, also points anterior to the acromion, and also posterior to the acromion.  Any abduction greater than about 90 degrees is painful.  #3 he has neuropathic pain for which she takes Neurontin 300 mg 3 times a day  #4 he has been summoned for [facility] jury duty.  He is concerned that his urge incontinence from prostate dysfunction would make it unreasonable to sit for jury duty.  He states that he has to urinate as much as every hour, he has little warning when he needs to go void, if he is not able to void within a couple minutes, he often times has incontinence.  Medical treatments have not been adequate to make jury duty reasonable.  He asked to be excused from jury duty.           The following portions of the patient's history were reviewed and updated as appropriate: allergies, current medications, past family history, past medical history, past social history, past surgical history and problem list.    Review of Systems   Constitutional: Negative for chills, fever and unexpected weight loss.   HENT: Negative for congestion and sore throat.    Eyes: Negative for blurred vision and visual disturbance.   Respiratory: Negative for cough and wheezing.    Cardiovascular: Negative for chest pain and palpitations.   Gastrointestinal: Negative for abdominal pain and diarrhea.   Endocrine: Negative for cold intolerance and heat intolerance.   Genitourinary: Negative for dysuria.   Musculoskeletal: Negative for arthralgias and neck stiffness.   Neurological: Negative for dizziness, seizures and syncope.   Psychiatric/Behavioral: Negative  for self-injury, suicidal ideas and depressed mood.       Objective   Physical Exam  Vitals and nursing note reviewed.   Constitutional:       Appearance: He is well-developed.   HENT:      Head: Normocephalic and atraumatic.      Right Ear: External ear normal.      Left Ear: External ear normal.      Nose: Nose normal.   Eyes:      Conjunctiva/sclera: Conjunctivae normal.      Pupils: Pupils are equal, round, and reactive to light.   Cardiovascular:      Rate and Rhythm: Normal rate and regular rhythm.      Heart sounds: Normal heart sounds.   Pulmonary:      Effort: Pulmonary effort is normal.      Breath sounds: Normal breath sounds.   Abdominal:      General: Bowel sounds are normal.      Palpations: Abdomen is soft.   Musculoskeletal:      Cervical back: Normal range of motion and neck supple.      Comments: Left shoulder appears normal.  He has spasm of the rhomboid muscles and poor scapular translation.  He has pain to palpation that is mild anterior shoulder, as well as lateral to the acromion.  Pain is worse with abduction, particularly the empty can test.  Supraspinatus tendinitis and perhaps partial rotator cuff tear both possible.  Because of the widespread pain over the top of his shoulder, including anteriorly, bursitis is also possible.     Skin:     General: Skin is warm and dry.   Neurological:      Mental Status: He is alert and oriented to person, place, and time.   Psychiatric:         Behavior: Behavior normal.           Assessment & Plan   Diagnoses and all orders for this visit:    1. Acute pain of left shoulder (Primary)  On initial examination of the shoulder.  No bony abnormality suspected.  He has full range of motion though it is painful.  I recommend rest, ice, and stretching of the thoracic back and rhomboids to see if this helps with scapular dyskinesis.  If no improvement, recommend follow-up in a few weeks to recheck.  Osteopathic manipulation may be beneficial at that time.  2.  Simple chronic bronchitis  -     albuterol sulfate  (90 Base) MCG/ACT inhaler; Inhale 2 puffs Every 4 (Four) Hours As Needed for Wheezing.  Dispense: 18 g; Refill: 3  -     budesonide-formoterol (SYMBICORT) 160-4.5 MCG/ACT inhaler; Inhale 2 puffs 2 (Two) Times a Day.  Dispense: 10.2 g; Refill: 4    3. Neuropathic pain  -     gabapentin (NEURONTIN) 300 MG capsule; Take 1 capsule by mouth 3 (Three) Times a Day.  Dispense: 270 capsule; Refill: 0    4. Acquired hypothyroidism  -     levothyroxine (SYNTHROID, LEVOTHROID) 112 MCG tablet; Take 1 tablet by mouth Daily.  Dispense: 90 tablet; Refill: 0  Recommend blood work at follow-up  5. Administrative encounter  He reports incontinence of urine if unable to void within a few minutes of initial urge to void.  It is my medical opinion, that this being the case he is not suitable for jury duty.  Note can be found scanned into his chart.  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 Urge incontinence of urine  As above                BMI is >= 25 and <30. (Overweight) The following options were offered after discussion;: exercise counseling/recommendations and nutrition counseling/recommendations          This document has been electronically signed by Ave Corley  May 11, 2023 08:53 EDT    Dictated Utilizing Dragon Dictation: Part of this note may be an electronic transcription/translation of spoken language to printed text using the Dragon Dictation System.

## 2023-08-11 ENCOUNTER — OFFICE VISIT (OUTPATIENT)
Dept: FAMILY MEDICINE CLINIC | Facility: CLINIC | Age: 63
End: 2023-08-11
Payer: MEDICARE

## 2023-08-11 VITALS
HEART RATE: 84 BPM | BODY MASS INDEX: 26.19 KG/M2 | WEIGHT: 176.8 LBS | TEMPERATURE: 97.8 F | HEIGHT: 69 IN | SYSTOLIC BLOOD PRESSURE: 126 MMHG | OXYGEN SATURATION: 98 % | DIASTOLIC BLOOD PRESSURE: 74 MMHG

## 2023-08-11 DIAGNOSIS — Q89.01 ASPLENIA: ICD-10-CM

## 2023-08-11 DIAGNOSIS — E78.1 HYPERTRIGLYCERIDEMIA: ICD-10-CM

## 2023-08-11 DIAGNOSIS — M79.2 NEUROPATHIC PAIN: ICD-10-CM

## 2023-08-11 DIAGNOSIS — Z87.39 HISTORY OF GOUT: ICD-10-CM

## 2023-08-11 DIAGNOSIS — Z00.00 MEDICARE ANNUAL WELLNESS VISIT, SUBSEQUENT: Primary | ICD-10-CM

## 2023-08-11 DIAGNOSIS — E03.9 ACQUIRED HYPOTHYROIDISM: ICD-10-CM

## 2023-08-11 DIAGNOSIS — J41.0 SIMPLE CHRONIC BRONCHITIS: ICD-10-CM

## 2023-08-11 DIAGNOSIS — Z71.89 ADVANCED DIRECTIVES, COUNSELING/DISCUSSION: ICD-10-CM

## 2023-08-11 DIAGNOSIS — M25.571 ARTHRALGIA OF RIGHT ANKLE: ICD-10-CM

## 2023-08-11 PROCEDURE — 84550 ASSAY OF BLOOD/URIC ACID: CPT | Performed by: FAMILY MEDICINE

## 2023-08-11 PROCEDURE — 80053 COMPREHEN METABOLIC PANEL: CPT | Performed by: FAMILY MEDICINE

## 2023-08-11 PROCEDURE — 84443 ASSAY THYROID STIM HORMONE: CPT | Performed by: FAMILY MEDICINE

## 2023-08-11 PROCEDURE — 85652 RBC SED RATE AUTOMATED: CPT | Performed by: FAMILY MEDICINE

## 2023-08-11 PROCEDURE — 36415 COLL VENOUS BLD VENIPUNCTURE: CPT | Performed by: FAMILY MEDICINE

## 2023-08-11 PROCEDURE — 80061 LIPID PANEL: CPT | Performed by: FAMILY MEDICINE

## 2023-08-11 PROCEDURE — 85027 COMPLETE CBC AUTOMATED: CPT | Performed by: FAMILY MEDICINE

## 2023-08-11 RX ORDER — BUDESONIDE AND FORMOTEROL FUMARATE DIHYDRATE 160; 4.5 UG/1; UG/1
2 AEROSOL RESPIRATORY (INHALATION)
Qty: 10.2 G | Refills: 4 | Status: SHIPPED | OUTPATIENT
Start: 2023-08-11

## 2023-08-11 RX ORDER — LEVOTHYROXINE SODIUM 112 UG/1
112 TABLET ORAL DAILY
Qty: 90 TABLET | Refills: 1 | Status: SHIPPED | OUTPATIENT
Start: 2023-08-11

## 2023-08-11 RX ORDER — GABAPENTIN 300 MG/1
300 CAPSULE ORAL 3 TIMES DAILY
Qty: 270 CAPSULE | Refills: 0 | Status: SHIPPED | OUTPATIENT
Start: 2023-08-11

## 2023-08-11 RX ORDER — ALBUTEROL SULFATE 90 UG/1
2 AEROSOL, METERED RESPIRATORY (INHALATION) EVERY 4 HOURS PRN
Qty: 18 G | Refills: 3 | Status: SHIPPED | OUTPATIENT
Start: 2023-08-11

## 2023-08-11 NOTE — PROGRESS NOTES
QUICK REFERENCE INFORMATION:  The ABCs of the Annual Wellness Visit    Subsequent Medicare Wellness Visit    HEALTH RISK ASSESSMENT    1960    Recent Hospitalizations:  No hospitalization(s) within the last year..        Current Medical Providers:  Patient Care Team:  Jesus Webster DO as PCP - General (Family Medicine)        Smoking Status:  Social History     Tobacco Use   Smoking Status Former    Packs/day: 0.50    Years: 8.00    Pack years: 4.00    Types: Cigarettes    Quit date: 2020    Years since quitting: 3.5   Smokeless Tobacco Current    Types: Chew       Alcohol Consumption:  Social History     Substance and Sexual Activity   Alcohol Use No       Depression Screen:       2023     8:29 AM   PHQ-2/PHQ-9 Depression Screening   Little Interest or Pleasure in Doing Things 0-->not at all   Feeling Down, Depressed or Hopeless 0-->not at all   PHQ-9: Brief Depression Severity Measure Score 0       Health Habits and Functional and Cognitive Screenin/11/2023     8:29 AM   Functional & Cognitive Status   Do you have difficulty preparing food and eating? Yes   Do you have difficulty bathing yourself, getting dressed or grooming yourself? Yes   Do you have difficulty using the toilet? No   Do you have difficulty moving around from place to place? No   Do you have trouble with steps or getting out of a bed or a chair? Yes   Current Diet Well Balanced Diet   Dental Exam Up to date   Eye Exam Up to date   Exercise (times per week) 5 times per week   Current Exercises Include Walking   Do you need help using the phone?  No   Are you deaf or do you have serious difficulty hearing?  No   Do you need help to go to places out of walking distance? No   Do you need help shopping? No   Do you need help preparing meals?  No   Do you need help with housework?  No   Do you need help with laundry? No   Do you need help taking your medications? No   Do you need help managing money? No   Do you ever drive  or ride in a car without wearing a seat belt? No           Does the patient have evidence of cognitive impairment? No    Aspirin use counseling: Does not need ASA (and currently is not on it)      Recent Lab Results:  CMP:  Lab Results   Component Value Date    BUN 12 01/27/2023    CREATININE 0.97 01/27/2023    EGFRIFNONA 96 01/28/2022    BCR 12.4 01/27/2023     01/27/2023    K 4.4 01/27/2023    CO2 22.7 01/27/2023    CALCIUM 9.4 01/27/2023    ALBUMIN 4.5 01/27/2023    BILITOT 0.3 01/27/2023    ALKPHOS 79 01/27/2023    AST 20 01/27/2023    ALT 23 01/27/2023     Lipid Panel:  Lab Results   Component Value Date    CHOL 171 11/16/2022    TRIG 287 (H) 11/16/2022    HDL 36 (L) 11/16/2022    VLDL 48 (H) 11/16/2022    LDLHDL 2.16 11/16/2022     HbA1c:       Visual Acuity:  Vision Screening    Right eye Left eye Both eyes   Without correction 20/15 20/25 20/15   With correction          Age-appropriate Screening Schedule:  Refer to the list below for future screening recommendations based on patient's age, sex and/or medical conditions. Orders for these recommended tests are listed in the plan section. The patient has been provided with a written plan.    Health Maintenance   Topic Date Due    TDAP/TD VACCINES (1 - Tdap) Never done    ZOSTER VACCINE (1 of 2) Never done    Pneumococcal Vaccine 0-64 (2 - PCV) 10/30/2009    HEPATITIS C SCREENING  Never done    ANNUAL WELLNESS VISIT  01/28/2023    INFLUENZA VACCINE  10/01/2023    LIPID PANEL  11/16/2023    COLORECTAL CANCER SCREENING  05/31/2025    COVID-19 Vaccine  Completed        Subjective   History of Present Illness    Hussein Roland is a 62 y.o. male who presents for an Subsequent Wellness Visit.    The following portions of the patient's history were reviewed and updated as appropriate: allergies, current medications, past family history, past medical history, past social history, past surgical history, and problem list.    Outpatient Medications Prior to Visit    Medication Sig Dispense Refill    albuterol sulfate  (90 Base) MCG/ACT inhaler Inhale 2 puffs Every 4 (Four) Hours As Needed for Wheezing. 18 g 3    budesonide-formoterol (SYMBICORT) 160-4.5 MCG/ACT inhaler Inhale 2 puffs 2 (Two) Times a Day. 10.2 g 4    gabapentin (NEURONTIN) 300 MG capsule Take 1 capsule by mouth 3 (Three) Times a Day. 270 capsule 0    levothyroxine (SYNTHROID, LEVOTHROID) 112 MCG tablet Take 1 tablet by mouth Daily. 90 tablet 0     No facility-administered medications prior to visit.       Patient Active Problem List   Diagnosis    Asplenia    Simple chronic bronchitis    Leukocytosis    Acquired hypothyroidism       Advance Care Planning:  ACP discussion was held with the patient during this visit. Patient does not have an advance directive, information provided.    Identification of Risk Factors:  Risk factors include: Advance Directive Discussion.    Review of Systems   Constitutional:  Negative for chills, fever and unexpected weight loss.   HENT:  Negative for congestion and sore throat.    Eyes:  Negative for blurred vision and visual disturbance.   Respiratory:  Negative for cough and wheezing.    Cardiovascular:  Negative for chest pain and palpitations.   Gastrointestinal:  Negative for abdominal pain and diarrhea.   Endocrine: Negative for cold intolerance and heat intolerance.   Genitourinary:  Negative for dysuria.   Musculoskeletal:  Negative for arthralgias and neck stiffness.   Neurological:  Negative for dizziness, seizures and syncope.   Psychiatric/Behavioral:  Negative for self-injury, suicidal ideas and depressed mood.      Compared to one year ago, the patient feels his physical health is the same.  Compared to one year ago, the patient feels his mental health is the same.    Objective     Physical Exam  Vitals and nursing note reviewed.   Constitutional:       Appearance: He is well-developed.   HENT:      Head: Normocephalic and atraumatic.      Right Ear: External  "ear normal.      Left Ear: External ear normal.      Nose: Nose normal.   Eyes:      Conjunctiva/sclera: Conjunctivae normal.      Pupils: Pupils are equal, round, and reactive to light.   Cardiovascular:      Rate and Rhythm: Normal rate and regular rhythm.      Heart sounds: Normal heart sounds.   Pulmonary:      Effort: Pulmonary effort is normal.      Breath sounds: Normal breath sounds.   Abdominal:      General: Bowel sounds are normal.      Palpations: Abdomen is soft.   Musculoskeletal:      Cervical back: Normal range of motion and neck supple.      Comments: Right foot is normal.  Has limited range of motion in plantar flexion, though I am unsure if this is contributing to his pain with walking.  There is no redness or discoloration, no swelling, tug and squeeze testing of the foot does not reproduce pain.  Arthralgia at the Lisfranc joint from arthritis suspected.   Skin:     General: Skin is warm and dry.   Neurological:      Mental Status: He is alert and oriented to person, place, and time.   Psychiatric:         Behavior: Behavior normal.       Vitals:    08/11/23 0827   BP: 126/74   BP Location: Right arm   Patient Position: Sitting   Pulse: 84   Temp: 97.8 øF (36.6 øC)   SpO2: 98%   Weight: 80.2 kg (176 lb 12.8 oz)   Height: 175.3 cm (69.02\")              Assessment & Plan   Patient Self-Management and Personalized Health Advice  The patient has been provided with information about: diet and exercise and preventive services including:   Annual Wellness Visit (AWV).    Visit Diagnoses:    ICD-10-CM ICD-9-CM   1. Medicare annual wellness visit, subsequent  Z00.00 V70.0   2. Acquired hypothyroidism  E03.9 244.9   3. Simple chronic bronchitis  J41.0 491.0   4. Neuropathic pain  M79.2 729.2   5. Hypertriglyceridemia  E78.1 272.1   6. Advanced directives, counseling/discussion  Z71.89 V65.49   7. Asplenia  Q89.01 759.0   8. History of gout  Z87.39 V12.29   9. Arthralgia of right ankle  M25.571 719.47   No " major concerns on Medicare wellness.  #4 gabapentin refilled.  Will get UDS at follow-up.  #6 he would like for his wife to be his decision-maker in the event he is unable.  She was updated to be his healthcare surrogate.  #8 he has a history of gout.  It was never confirmed, he was told at one point that he had gout because he had toe pain.  I question the diagnosis.  In particular, his exam today of his right ankle does not suggest gout.  It suggest arthritis in the midfoot.  No imaging indicated at this time as his pain is actually better now than it was a couple days ago.        Orders Placed This Encounter   Procedures    Comprehensive metabolic panel     Standing Status:   Future     Standing Expiration Date:   8/11/2024    Lipid panel     Standing Status:   Future     Standing Expiration Date:   8/11/2024    CBC No Differential     Standing Status:   Future     Standing Expiration Date:   8/11/2024    TSH     Standing Status:   Future     Standing Expiration Date:   8/11/2024    Uric acid     Standing Status:   Future     Standing Expiration Date:   8/11/2024    Sedimentation Rate     Standing Status:   Future     Standing Expiration Date:   8/11/2024       Outpatient Encounter Medications as of 8/11/2023   Medication Sig Dispense Refill    albuterol sulfate  (90 Base) MCG/ACT inhaler Inhale 2 puffs Every 4 (Four) Hours As Needed for Wheezing. 18 g 3    budesonide-formoterol (SYMBICORT) 160-4.5 MCG/ACT inhaler Inhale 2 puffs 2 (Two) Times a Day. 10.2 g 4    gabapentin (NEURONTIN) 300 MG capsule Take 1 capsule by mouth 3 (Three) Times a Day. 270 capsule 0    levothyroxine (SYNTHROID, LEVOTHROID) 112 MCG tablet Take 1 tablet by mouth Daily. 90 tablet 1    [DISCONTINUED] albuterol sulfate  (90 Base) MCG/ACT inhaler Inhale 2 puffs Every 4 (Four) Hours As Needed for Wheezing. 18 g 3    [DISCONTINUED] budesonide-formoterol (SYMBICORT) 160-4.5 MCG/ACT inhaler Inhale 2 puffs 2 (Two) Times a Day. 10.2 g  4    [DISCONTINUED] gabapentin (NEURONTIN) 300 MG capsule Take 1 capsule by mouth 3 (Three) Times a Day. 270 capsule 0    [DISCONTINUED] levothyroxine (SYNTHROID, LEVOTHROID) 112 MCG tablet Take 1 tablet by mouth Daily. 90 tablet 0     No facility-administered encounter medications on file as of 8/11/2023.       Reviewed use of high risk medication in the elderly: yes  Reviewed for potential of harmful drug interactions in the elderly: yes    Follow Up:  Return in about 3 months (around 11/11/2023) for Recheck.     An After Visit Summary and PPPS with all of these plans were given to the patient.               This document has been electronically signed by Jesus Webster DO  August 11, 2023 09:05 EDT    Dictated Utilizing Dragon Dictation: Part of this note may be an electronic transcription/translation of spoken language to printed text using the Dragon Dictation System.

## 2023-08-12 LAB
ALBUMIN SERPL-MCNC: 4.3 G/DL (ref 3.5–5.2)
ALBUMIN/GLOB SERPL: 1.3 G/DL
ALP SERPL-CCNC: 75 U/L (ref 39–117)
ALT SERPL W P-5'-P-CCNC: 22 U/L (ref 1–41)
ANION GAP SERPL CALCULATED.3IONS-SCNC: 11.1 MMOL/L (ref 5–15)
AST SERPL-CCNC: 19 U/L (ref 1–40)
BILIRUB SERPL-MCNC: <0.2 MG/DL (ref 0–1.2)
BUN SERPL-MCNC: 11 MG/DL (ref 8–23)
BUN/CREAT SERPL: 12.6 (ref 7–25)
CALCIUM SPEC-SCNC: 9.4 MG/DL (ref 8.6–10.5)
CHLORIDE SERPL-SCNC: 102 MMOL/L (ref 98–107)
CHOLEST SERPL-MCNC: 169 MG/DL (ref 0–200)
CO2 SERPL-SCNC: 26.9 MMOL/L (ref 22–29)
CREAT SERPL-MCNC: 0.87 MG/DL (ref 0.76–1.27)
DEPRECATED RDW RBC AUTO: 49.4 FL (ref 37–54)
EGFRCR SERPLBLD CKD-EPI 2021: 98.8 ML/MIN/1.73
ERYTHROCYTE [DISTWIDTH] IN BLOOD BY AUTOMATED COUNT: 14.7 % (ref 12.3–15.4)
ERYTHROCYTE [SEDIMENTATION RATE] IN BLOOD: 20 MM/HR (ref 0–20)
GLOBULIN UR ELPH-MCNC: 3.2 GM/DL
GLUCOSE SERPL-MCNC: 112 MG/DL (ref 65–99)
HCT VFR BLD AUTO: 39.6 % (ref 37.5–51)
HDLC SERPL-MCNC: 32 MG/DL (ref 40–60)
HGB BLD-MCNC: 13.5 G/DL (ref 13–17.7)
LDLC SERPL CALC-MCNC: 79 MG/DL (ref 0–100)
LDLC/HDLC SERPL: 2.06 {RATIO}
MCH RBC QN AUTO: 31.5 PG (ref 26.6–33)
MCHC RBC AUTO-ENTMCNC: 34.1 G/DL (ref 31.5–35.7)
MCV RBC AUTO: 92.5 FL (ref 79–97)
PLATELET # BLD AUTO: 295 10*3/MM3 (ref 140–450)
PMV BLD AUTO: 12.3 FL (ref 6–12)
POTASSIUM SERPL-SCNC: 4.7 MMOL/L (ref 3.5–5.2)
PROT SERPL-MCNC: 7.5 G/DL (ref 6–8.5)
RBC # BLD AUTO: 4.28 10*6/MM3 (ref 4.14–5.8)
SODIUM SERPL-SCNC: 140 MMOL/L (ref 136–145)
TRIGL SERPL-MCNC: 356 MG/DL (ref 0–150)
TSH SERPL DL<=0.05 MIU/L-ACNC: 1.5 UIU/ML (ref 0.27–4.2)
URATE SERPL-MCNC: 9.5 MG/DL (ref 3.4–7)
VLDLC SERPL-MCNC: 58 MG/DL (ref 5–40)
WBC NRBC COR # BLD: 10.66 10*3/MM3 (ref 3.4–10.8)

## 2023-08-15 ENCOUNTER — TELEPHONE (OUTPATIENT)
Dept: FAMILY MEDICINE CLINIC | Facility: CLINIC | Age: 63
End: 2023-08-15
Payer: MEDICARE

## 2023-08-15 NOTE — TELEPHONE ENCOUNTER
"----- Message from Jesus Webster DO sent at 8/15/2023  8:04 AM EDT -----  I reviewed your blood work.  Your triglycerides are again elevated.  Your triglycerides have gone up high enough that it would warrant medication.  Have you ever been on cholesterol medication?    Also, your uric acid was elevated.  This suggests that you could very well have gout.  Have you ever taken medication for gout?  Allopurinol?  Uloric?  Colchicine?  A gout friendly diet recommended, we could also consider starting allopurinol.  When we start allopurinol, we often start colchicine with that for a while to prevent gout attacks.  We could discuss at follow-up, or we can start medication today.    Lastly, I recommend starting a cholesterol medication for your triglycerides, or gout medication, not both.    Spoke with patient.  He is willing to start on medication for triglycerides but would like to wait on gout medication as his \"foot is better right now\".   "

## 2023-08-16 ENCOUNTER — TELEPHONE (OUTPATIENT)
Dept: FAMILY MEDICINE CLINIC | Facility: CLINIC | Age: 63
End: 2023-08-16

## 2023-08-16 DIAGNOSIS — E78.1 HYPERTRIGLYCERIDEMIA: Primary | ICD-10-CM

## 2023-08-16 RX ORDER — FENOFIBRATE 145 MG/1
145 TABLET, COATED ORAL DAILY
Qty: 90 TABLET | Refills: 0 | Status: SHIPPED | OUTPATIENT
Start: 2023-08-16

## 2023-08-16 NOTE — TELEPHONE ENCOUNTER
"Caller: Hussein Roland \"Julio\"    Relationship: Self    Best call back number: [phone]    What medication are you requesting:     CHOLESTEROL    If a prescription is needed, what is your preferred pharmacy and phone number:      Additional notes:    PATIENT WAS IN OFFICE YESTERDAY AND DOCTOR STATED THEY WERE GOING TO CALL IN CHOLESTEROL MEDICATION TO [facility].      ALSO PATIENT NEEDS GABAPENTIN.  HE DID NOT GET THAT AFTER IT WAS SENT IN ON 8/11    [facility] [id number] - [place], [place] - [address] [facility] AT [address] [place] [address] - [phone] [facility] [phone] FX    "

## 2023-11-09 ENCOUNTER — OFFICE VISIT (OUTPATIENT)
Dept: FAMILY MEDICINE CLINIC | Facility: CLINIC | Age: 63
End: 2023-11-09
Payer: MEDICARE

## 2023-11-09 VITALS
WEIGHT: 183.4 LBS | OXYGEN SATURATION: 97 % | BODY MASS INDEX: 27.16 KG/M2 | TEMPERATURE: 98.6 F | HEART RATE: 80 BPM | DIASTOLIC BLOOD PRESSURE: 74 MMHG | SYSTOLIC BLOOD PRESSURE: 128 MMHG | HEIGHT: 69 IN

## 2023-11-09 DIAGNOSIS — M79.2 NEUROPATHIC PAIN: ICD-10-CM

## 2023-11-09 DIAGNOSIS — Q89.01 ASPLENIA: ICD-10-CM

## 2023-11-09 DIAGNOSIS — E78.1 HYPERTRIGLYCERIDEMIA: ICD-10-CM

## 2023-11-09 DIAGNOSIS — E03.9 ACQUIRED HYPOTHYROIDISM: ICD-10-CM

## 2023-11-09 DIAGNOSIS — Z87.891 FORMER SMOKER: ICD-10-CM

## 2023-11-09 DIAGNOSIS — Z87.39 HISTORY OF GOUT: ICD-10-CM

## 2023-11-09 DIAGNOSIS — Z23 NEED FOR INFLUENZA VACCINATION: ICD-10-CM

## 2023-11-09 DIAGNOSIS — Z23 NEED FOR PNEUMOCOCCAL VACCINATION: ICD-10-CM

## 2023-11-09 DIAGNOSIS — J41.0 SIMPLE CHRONIC BRONCHITIS: Primary | ICD-10-CM

## 2023-11-09 PROCEDURE — 84443 ASSAY THYROID STIM HORMONE: CPT | Performed by: FAMILY MEDICINE

## 2023-11-09 PROCEDURE — 85027 COMPLETE CBC AUTOMATED: CPT | Performed by: FAMILY MEDICINE

## 2023-11-09 PROCEDURE — 80053 COMPREHEN METABOLIC PANEL: CPT | Performed by: FAMILY MEDICINE

## 2023-11-09 PROCEDURE — 80061 LIPID PANEL: CPT | Performed by: FAMILY MEDICINE

## 2023-11-09 PROCEDURE — 36415 COLL VENOUS BLD VENIPUNCTURE: CPT | Performed by: FAMILY MEDICINE

## 2023-11-09 PROCEDURE — 84550 ASSAY OF BLOOD/URIC ACID: CPT | Performed by: FAMILY MEDICINE

## 2023-11-09 RX ORDER — BUDESONIDE AND FORMOTEROL FUMARATE DIHYDRATE 160; 4.5 UG/1; UG/1
2 AEROSOL RESPIRATORY (INHALATION)
Qty: 10.2 G | Refills: 4 | Status: SHIPPED | OUTPATIENT
Start: 2023-11-09

## 2023-11-09 RX ORDER — ALBUTEROL SULFATE 90 UG/1
2 AEROSOL, METERED RESPIRATORY (INHALATION) EVERY 4 HOURS PRN
Qty: 18 G | Refills: 3 | Status: SHIPPED | OUTPATIENT
Start: 2023-11-09

## 2023-11-09 RX ORDER — FENOFIBRATE 145 MG/1
145 TABLET, COATED ORAL DAILY
Qty: 90 TABLET | Refills: 1 | Status: SHIPPED | OUTPATIENT
Start: 2023-11-09

## 2023-11-09 RX ORDER — LEVOTHYROXINE SODIUM 112 UG/1
112 TABLET ORAL DAILY
Qty: 90 TABLET | Refills: 1 | Status: SHIPPED | OUTPATIENT
Start: 2023-11-09

## 2023-11-09 RX ORDER — GABAPENTIN 300 MG/1
300 CAPSULE ORAL 3 TIMES DAILY
Qty: 270 CAPSULE | Refills: 0 | Status: SHIPPED | OUTPATIENT
Start: 2023-11-09

## 2023-11-09 NOTE — PROGRESS NOTES
Subjective   Hussein Roland is a 62 y.o. male.   Pt presents today with CC of Hypothyroidism      History of Present Illness   History of Present Illness  1.  Patient is a 62-year-old male with chronic bronchitis for which he takes Symbicort and albuterol.  He is a former smoker.  He smoked for nearly 40 years and reports that he was a .  He was recently evaluated for pneumoconiosis, though confirmation has not returned.  #2 he has hypertriglyceridemia for which he takes Tricor.  He is agreeable to blood work today.  He was started on Tricor this year.  #3 he has hypothyroidism for which she takes levothyroxine 112 mcg daily.  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  He is agreeable to blood work.  He had elevated uric acid.  He has treating this with a gout friendly diet currently.  #6 he has neuropathic pain for Billy 300 mg 3 times a day.  He has been stable on this regimen, controlled substance agreement signed his chart, and he has a UDS from May that was appropriate.         The following portions of the patient's history were reviewed and updated as appropriate: allergies, current medications, past family history, past medical history, past social history, past surgical history, and problem list.    Review of Systems   Constitutional:  Negative for chills, fever and unexpected weight loss.   HENT:  Negative for congestion and sore throat.    Eyes:  Negative for blurred vision and visual disturbance.   Respiratory:  Negative for cough and wheezing.    Cardiovascular:  Negative for chest pain and palpitations.   Gastrointestinal:  Negative for abdominal pain and diarrhea.   Endocrine: Negative for cold intolerance and heat intolerance.   Genitourinary:  Negative for dysuria.   Musculoskeletal:  Negative for arthralgias and neck stiffness.   Neurological:  Negative for dizziness, seizures and syncope.   Psychiatric/Behavioral:  Negative for self-injury, suicidal ideas and depressed mood.        Objective    Physical Exam  Vitals and nursing note reviewed.   Constitutional:       Appearance: He is well-developed.   HENT:      Head: Normocephalic and atraumatic.      Right Ear: External ear normal.      Left Ear: External ear normal.      Nose: Nose normal.   Eyes:      Conjunctiva/sclera: Conjunctivae normal.      Pupils: Pupils are equal, round, and reactive to light.   Cardiovascular:      Rate and Rhythm: Normal rate and regular rhythm.      Heart sounds: Normal heart sounds.   Pulmonary:      Effort: Pulmonary effort is normal. No respiratory distress.      Breath sounds: Normal breath sounds. No wheezing, rhonchi or rales.   Abdominal:      General: Bowel sounds are normal.      Palpations: Abdomen is soft.   Musculoskeletal:      Cervical back: Normal range of motion and neck supple.   Skin:     General: Skin is warm and dry.   Neurological:      Mental Status: He is alert and oriented to person, place, and time.   Psychiatric:         Behavior: Behavior normal.           Assessment & Plan   Diagnoses and all orders for this visit:    1. Simple chronic bronchitis (Primary)  -     albuterol sulfate  (90 Base) MCG/ACT inhaler; Inhale 2 puffs Every 4 (Four) Hours As Needed for Wheezing.  Dispense: 18 g; Refill: 3  -     budesonide-formoterol (SYMBICORT) 160-4.5 MCG/ACT inhaler; Inhale 2 puffs 2 (Two) Times a Day.  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  He is stable on his current regimen currently.  He quit smoking about 3 years ago.  2. Hypertriglyceridemia  -     fenofibrate (Tricor) 145 MG tablet; Take 1 tablet by mouth Daily.  Dispense: 90 tablet; Refill: 1  -     Lipid panel; Future    3. Acquired hypothyroidism  -     levothyroxine (SYNTHROID, LEVOTHROID) 112 MCG tablet; Take 1 tablet by mouth Daily.  Dispense: 90 tablet; Refill: 1  -     Comprehensive metabolic panel; Future  -     TSH; Future  -     CBC No Differential; Future    4. Need for  pneumococcal vaccination  Last pneumococcal 23 was over 10 years ago.  Recommend updated pneumococcal vaccine, particularly because of pulmonary disease and asplenia.  5. Need for influenza vaccination    6. Asplenia    7. History of gout  -     Uric acid; Future  He has not had any attacks.  We will recheck uric acid to see how he is doing.  8. Neuropathic pain  -     gabapentin (NEURONTIN) 300 MG capsule; Take 1 capsule by mouth 3 (Three) Times a Day.  Dispense: 270 capsule; Refill: 0    9. Former smoker                               This document has been electronically signed by Ave Corley  November 9, 2023 08:19 EST    Dictated Utilizing Dragon Dictation: Part of this note may be an electronic transcription/translation of spoken language to printed text using the Dragon Dictation System.

## 2023-11-10 LAB
ALBUMIN SERPL-MCNC: 4.5 G/DL (ref 3.5–5.2)
ALBUMIN/GLOB SERPL: 1.4 G/DL
ALP SERPL-CCNC: 48 U/L (ref 39–117)
ALT SERPL W P-5'-P-CCNC: 24 U/L (ref 1–41)
ANION GAP SERPL CALCULATED.3IONS-SCNC: 10.4 MMOL/L (ref 5–15)
AST SERPL-CCNC: 26 U/L (ref 1–40)
BILIRUB SERPL-MCNC: 0.5 MG/DL (ref 0–1.2)
BUN SERPL-MCNC: 18 MG/DL (ref 8–23)
BUN/CREAT SERPL: 15.7 (ref 7–25)
CALCIUM SPEC-SCNC: 9.1 MG/DL (ref 8.6–10.5)
CHLORIDE SERPL-SCNC: 103 MMOL/L (ref 98–107)
CHOLEST SERPL-MCNC: 178 MG/DL (ref 0–200)
CO2 SERPL-SCNC: 23.6 MMOL/L (ref 22–29)
CREAT SERPL-MCNC: 1.15 MG/DL (ref 0.76–1.27)
DEPRECATED RDW RBC AUTO: 47.4 FL (ref 37–54)
EGFRCR SERPLBLD CKD-EPI 2021: 72.9 ML/MIN/1.73
ERYTHROCYTE [DISTWIDTH] IN BLOOD BY AUTOMATED COUNT: 14.4 % (ref 12.3–15.4)
GLOBULIN UR ELPH-MCNC: 3.3 GM/DL
GLUCOSE SERPL-MCNC: 109 MG/DL (ref 65–99)
HCT VFR BLD AUTO: 38.8 % (ref 37.5–51)
HDLC SERPL-MCNC: 41 MG/DL (ref 40–60)
HGB BLD-MCNC: 13.5 G/DL (ref 13–17.7)
LDLC SERPL CALC-MCNC: 119 MG/DL (ref 0–100)
LDLC/HDLC SERPL: 2.86 {RATIO}
MCH RBC QN AUTO: 31.6 PG (ref 26.6–33)
MCHC RBC AUTO-ENTMCNC: 34.8 G/DL (ref 31.5–35.7)
MCV RBC AUTO: 90.9 FL (ref 79–97)
PLATELET # BLD AUTO: 349 10*3/MM3 (ref 140–450)
PMV BLD AUTO: 12.4 FL (ref 6–12)
POTASSIUM SERPL-SCNC: 4.2 MMOL/L (ref 3.5–5.2)
PROT SERPL-MCNC: 7.8 G/DL (ref 6–8.5)
RBC # BLD AUTO: 4.27 10*6/MM3 (ref 4.14–5.8)
SODIUM SERPL-SCNC: 137 MMOL/L (ref 136–145)
TRIGL SERPL-MCNC: 98 MG/DL (ref 0–150)
TSH SERPL DL<=0.05 MIU/L-ACNC: 3.22 UIU/ML (ref 0.27–4.2)
URATE SERPL-MCNC: 6.9 MG/DL (ref 3.4–7)
VLDLC SERPL-MCNC: 18 MG/DL (ref 5–40)
WBC NRBC COR # BLD: 10.79 10*3/MM3 (ref 3.4–10.8)

## 2023-11-13 ENCOUNTER — TELEPHONE (OUTPATIENT)
Dept: FAMILY MEDICINE CLINIC | Facility: CLINIC | Age: 63
End: 2023-11-13
Payer: MEDICARE

## 2023-11-13 NOTE — TELEPHONE ENCOUNTER
----- Message from Jesus Webster DO sent at 11/13/2023  2:27 PM EST -----  No problems on your blood work.    Called patient no answer and no voicemail option     Spoke to patient and made him aware, he voiced understanding.

## 2023-11-16 DIAGNOSIS — M79.2 NEUROPATHIC PAIN: ICD-10-CM

## 2023-11-16 RX ORDER — GABAPENTIN 300 MG/1
300 CAPSULE ORAL 3 TIMES DAILY
Qty: 270 CAPSULE | Refills: 0 | OUTPATIENT
Start: 2023-11-16

## 2023-11-16 NOTE — TELEPHONE ENCOUNTER
"Caller: Hussein Roland \"Julio\"    Relationship: Self    Best call back number: [phone]     Requested Prescriptions:   Requested Prescriptions     Pending Prescriptions Disp Refills    gabapentin (NEURONTIN) 300 MG capsule 270 capsule 0     Sig: Take 1 capsule by mouth 3 (Three) Times a Day.        Pharmacy where request should be sent: [facility] [id number] [place] [id number] [place] AT [address] [place] [address] [phone] [facility] [phone] FX     Last office visit with prescribing clinician: 11/9/2023   Last telemedicine visit with prescribing clinician: Visit date not found   Next office visit with prescribing clinician: 2/7/2024     Additional details provided by patient: PLEASE REFILL 90 DAY SUPPLY WITH REFILLS    Does the patient have less than a 3 day supply:  [] Yes  [x] No    Would you like a call back once the refill request has been completed: [] Yes [x] No    If the office needs to give you a call back, can they leave a voicemail: [x] Yes [] No    Ranulfo Frost Rep   11/16/23 10:34 EST     "

## 2024-02-19 DIAGNOSIS — M79.2 NEUROPATHIC PAIN: ICD-10-CM

## 2024-02-19 RX ORDER — GABAPENTIN 300 MG/1
300 CAPSULE ORAL 3 TIMES DAILY
Qty: 270 CAPSULE | OUTPATIENT
Start: 2024-02-19

## 2024-02-20 DIAGNOSIS — E78.1 HYPERTRIGLYCERIDEMIA: ICD-10-CM

## 2024-02-20 DIAGNOSIS — M79.2 NEUROPATHIC PAIN: ICD-10-CM

## 2024-02-21 RX ORDER — GABAPENTIN 300 MG/1
300 CAPSULE ORAL 3 TIMES DAILY
Qty: 90 CAPSULE | Refills: 0 | Status: SHIPPED | OUTPATIENT
Start: 2024-02-21

## 2024-02-21 RX ORDER — FENOFIBRATE 145 MG/1
145 TABLET, COATED ORAL DAILY
Qty: 90 TABLET | Refills: 1 | Status: SHIPPED | OUTPATIENT
Start: 2024-02-21

## 2024-04-15 ENCOUNTER — TELEPHONE (OUTPATIENT)
Dept: FAMILY MEDICINE CLINIC | Facility: CLINIC | Age: 64
End: 2024-04-15
Payer: MEDICARE

## 2024-04-15 NOTE — TELEPHONE ENCOUNTER
04/15/2024 PA for Symbicort submitted and approved. This approval is for 04/15/2024 until further notice. [facility] [id number] [address] Howard notified.

## 2024-04-30 DIAGNOSIS — M79.2 NEUROPATHIC PAIN: ICD-10-CM

## 2024-05-01 RX ORDER — GABAPENTIN 300 MG/1
300 CAPSULE ORAL 3 TIMES DAILY
Qty: 90 CAPSULE | OUTPATIENT
Start: 2024-05-01

## 2024-08-22 DIAGNOSIS — E03.9 ACQUIRED HYPOTHYROIDISM: ICD-10-CM

## 2024-08-22 RX ORDER — LEVOTHYROXINE SODIUM 112 UG/1
112 TABLET ORAL DAILY
Qty: 90 TABLET | Refills: 1 | OUTPATIENT
Start: 2024-08-22

## 2024-08-25 DIAGNOSIS — E03.9 ACQUIRED HYPOTHYROIDISM: ICD-10-CM

## 2024-08-26 RX ORDER — LEVOTHYROXINE SODIUM 112 UG/1
112 TABLET ORAL DAILY
Qty: 90 TABLET | Refills: 1 | OUTPATIENT
Start: 2024-08-26